# Patient Record
(demographics unavailable — no encounter records)

---

## 2024-10-14 NOTE — HISTORY OF PRESENT ILLNESS
[Disease: _____________________] : Disease: [unfilled] [M: ___] : M[unfilled] [AJCC Stage: ____] : AJCC Stage: [unfilled] [Therapy: ___] : Therapy: [unfilled] [1] : 1, Mild [90: Able to carry normal activity; minor signs or symptoms of disease.] : 90: Able to carry normal activity; minor signs or symptoms of disease.  [ECOG Performance Status: 1 - Restricted in physically strenuous activity but ambulatory and able to carry out work of a light or sedentary nature] : Performance Status: 1 - Restricted in physically strenuous activity but ambulatory and able to carry out work of a light or sedentary nature, e.g., light house work, office work [de-identified] : Gildardo Hawkins is a Cantonese speaking 67-year-old male with PMHx of HTN, HLD who presents to the clinic for hospital follow up of stage IV NSCLC - SCC histology, PD-L1 25%.  Onc hx: He had a recent hospitalization for syncope, was found to have left lung lingular mass with central necrosis and pleural effusion along with several LNodes. He was also found to have empyema and has been on antibiotics since.   7/8/24 CT scan showed 8.4 cm lingular mass with central necrosis and pleural effusion. He was also found to have a  2.3 cm left lateral pleural mass that abuts the lingular mass. He was also found to have persistent prevascular hypermetabolic chip mass~ 5.8 cm in diameter with central necrosis. This abuts a 2.2 cm aortopulmonary hypermetabolic node which is exerting mass effect on trachea. 1.3 cm R upper paratracheal mildly hypermetabolic node, 1.4 cm precarinal hypermetabolic node, 3.4 cm left hilar hypermetabolic node were also present.   7/26/24: Surgical Pathology Report  Left pleural and upper lobe biopsy showed Metastatic squamous cell carcinoma present in pleural tissue, and  marked fibrinous pleuritis with severe acute and chronic inflammation, ulceration, and fibrin deposition  - Left pleural fluid analysis was POSITIVE FOR MALIGNANT CELLS. Favor squamous cell carcinoma Tier II: Variants of Potential Clinical Significance CDKN2A p.([id number]) TP53 p.([id number]) Tier III: Variants of Unknown Clinical Significance ATR p.([id number]) TMB-high PDL-1 25%  7/16/24 MRI Brain: No hemorrhage, mass, midline shift or infarction. No abnormal enhancement. Mild white matter disease likely mild chronic microvascular ischemic disease.  7/24/24: PET/CT: Hypermetablic lingular mass with central necrosis, compatible with neoplasm. Persistent moderate left pleural effusion. Areas of increased FDG uptake in the left pleura, suspicious neoplastic involvement; most prominently, 2.3 cm left lateral pleural hypermetabolic mass. Mediastinal (including in the upper right paratracheal chip station) and left hilar hypermetabolic lymphadenopathy, compatible with metastatic neoplasm. The largest of these is perivascular lymph chip mass with necrosis.  Mildly thickened left adrenal gland with foci of increased FDG uptake; metastases cannot be excluded. Mildly thickened right adrenal gland, without abnormal FDG uptake. Medialization of the left vocal cord, which may be seen with left vocal cord paralysis. This may be related to the dominant mediastinal mass. Persistent peripheral left lower lobe airspace opacities without significant FDG uptake. This may be due to compressive atelectasis from the aforementioned left pleural effusion.   8/9/24: XR Chest: There is peripheral repositioning of a left-sided PICC the tip located in the midsuperior vena cava. The cardiac mediastinal and hilar contours are unchanged. Again noted is increased opacity over the left lower lung zone with silhouetting of the left heart border. There may be some air bronchograms and a parenchymal pneumonia/atelectasis unchanged from prior imaging cannot be excluded. There is left hemidiaphragm elevation. No pneumothorax.  9/3/24: CT Chest/Abdomen/Pelvis:  Marked worsening of pleural-parenchymal malignant process in the left hemithorax with enlargement of the existing masses and appearance of new pleural nodules. Worsening mediastinal lymphadenopathy. Small bone lesion in the left eighth rib with nondisplaced pathologic fracture due to direct pleural or distant metastasis. [de-identified] : squamous cell carcinoma - PDL-1 25%, CKDN2A [de-identified] : CTSx:   [de-identified] : Here for C2D1 carboplatin/Abraxane/pembrolizumab. Following diarrhea noted at last clinic visit, c. diff testing was found to be positive.  He was hospitalized at [facility] from 10/1/24-10/4/24 for c. diff colitis. Now completed 10 days of PO vancomycin with resolution of diarrhea.  Is currently having 2-3 formed bowel movements daily. No fever, chills, n/v, or abdominal pain. Was also started on Nystatin for suspected thrush, s/p course with improvement in dry mouth. Dysgeusia has improved (decreased taste, bitter taste). Appetite is good. [FreeTextEntry1] : - 9/12/2024: C1D1 carboplatin/Abraxane/pembrolizumab 9/19/2024: C1D8 Abraxane 9/26/2024: C1D15 Abraxane  [FreeTextEntry3] : Diarrhea

## 2024-10-14 NOTE — ASSESSMENT
[FreeTextEntry1] : Mr. Gildardo Hawkins is a Cantonese speaking 67-year-old male with PMHx of HTN, HLD who presents to the clinic for f/u of Stage IV squamous cell carcinoma     # stage IV squamous cell carcinoma (based on malignant pleural effusion)  --Liquid NGS testing showed PTEN R159S and [id number] positive, MSI high not detected --Tumor NGS PDL-1 25%, TMB-high, CDKN2A --risks include but are not limited to RALPH, liver injury, pancytopenias, sepsis, bleeding, infection, hair loss, neuropathy, allergic reactions, death. Educational materials in Cantonese provided. Informed consent signed. --CT CAP obtained 9/3, to establish new baseline. --started carboplatin/Abraxane/pembrolizumab on 9/12/24 --CBC, CMP weekly & TSH every 6 weeks. --labs reviewed, cleared for C2D1 carboplatin/Abraxane/pembrolizumab today  #diarrhea [RESOLVED] On C1D15, was having 7-8 episodes of loose stools daily. Was found to be c. diff positive, was hospitalized 10/1/24 to 10/4/24. s/p PO vancomycin x 10 days (last dose 10/11/24). Now having 2-3 formed BMs daily. - Imodium PRN - will monitor for diarrhea recurrence, would be concerned for c. diff vs. colitis 2/2 immunotherapy  #acute kidney injury [RESOLVED] - seen on C1D15 (Cr 1.20), ISO c. diff diarrhea, likely pre-renal due to GI losses. Now resolved. - encouraging PO hydration  # hypercalcemia [RESOLVED] - trend weekly CMP  RTC in 1 week for office visit, followed by C2D8 abraxane Labs- CBC, CMP  Please see attending physician attestation below.

## 2024-10-14 NOTE — ASSESSMENT
[FreeTextEntry1] : Mr. Gildardo Hawkins is a Cantonese speaking 67-year-old male with PMHx of HTN, HLD who presents to the clinic for f/u of Stage IV squamous cell carcinoma     # stage IV squamous cell carcinoma (based on malignant pleural effusion)  --Liquid NGS testing showed PTEN R159S and [id number] positive, MSI high not detected --Tumor NGS PDL-1 25%, TMB-high, CDKN2A --risks include but are not limited to RALPH, liver injury, pancytopenias, sepsis, bleeding, infection, hair loss, neuropathy, allergic reactions, death. Educational materials in Cantonese provided. Informed consent signed. --CT CAP obtained 9/3, to establish new baseline. --started carboplatin/Abraxane/pembrolizumab on 9/12/24 --CBC, CMP weekly & TSH every 6 weeks. --labs reviewed, cleared for C2D1 carboplatin/Abraxane/pembrolizumab today  #diarrhea [RESOLVED] On C1D15, was having 7-8 episodes of loose stools daily. Was found to be c. diff positive, was hospitalized 10/1/24 to 10/4/24. s/p PO vancomycin x 10 days (last dose 10/11/24). Now having 2-3 formed BMs daily. - Imodium PRN - will monitor for diarrhea recurrence, would be concerned for c. diff vs. colitis 2/2 immunotherapy  #acute kidney injury [RESOLVED] - seen on C1D15 (Cr 1.20), ISO c. diff diarrhea, likely pre-renal due to GI losses. Now resolved. - encouraging PO hydration  # hypercalcemia [RESOLVED] - trend weekly CMP  RTC in 1 week for office visit, followed by C2D8 abraxane Labs- CBC, CMP  Please see attending physician attestation below.

## 2024-10-14 NOTE — HISTORY OF PRESENT ILLNESS
[Disease: _____________________] : Disease: [unfilled] [M: ___] : M[unfilled] [AJCC Stage: ____] : AJCC Stage: [unfilled] [Therapy: ___] : Therapy: [unfilled] [1] : 1, Mild [90: Able to carry normal activity; minor signs or symptoms of disease.] : 90: Able to carry normal activity; minor signs or symptoms of disease.  [ECOG Performance Status: 1 - Restricted in physically strenuous activity but ambulatory and able to carry out work of a light or sedentary nature] : Performance Status: 1 - Restricted in physically strenuous activity but ambulatory and able to carry out work of a light or sedentary nature, e.g., light house work, office work [de-identified] : Gildardo Hawkins is a Cantonese speaking 67-year-old male with PMHx of HTN, HLD who presents to the clinic for hospital follow up of stage IV NSCLC - SCC histology, PD-L1 25%.  Onc hx: He had a recent hospitalization for syncope, was found to have left lung lingular mass with central necrosis and pleural effusion along with several LNodes. He was also found to have empyema and has been on antibiotics since.   7/8/24 CT scan showed 8.4 cm lingular mass with central necrosis and pleural effusion. He was also found to have a  2.3 cm left lateral pleural mass that abuts the lingular mass. He was also found to have persistent prevascular hypermetabolic chip mass~ 5.8 cm in diameter with central necrosis. This abuts a 2.2 cm aortopulmonary hypermetabolic node which is exerting mass effect on trachea. 1.3 cm R upper paratracheal mildly hypermetabolic node, 1.4 cm precarinal hypermetabolic node, 3.4 cm left hilar hypermetabolic node were also present.   7/26/24: Surgical Pathology Report  Left pleural and upper lobe biopsy showed Metastatic squamous cell carcinoma present in pleural tissue, and  marked fibrinous pleuritis with severe acute and chronic inflammation, ulceration, and fibrin deposition  - Left pleural fluid analysis was POSITIVE FOR MALIGNANT CELLS. Favor squamous cell carcinoma Tier II: Variants of Potential Clinical Significance CDKN2A p.([id number]) TP53 p.([id number]) Tier III: Variants of Unknown Clinical Significance ATR p.([id number]) TMB-high PDL-1 25%  7/16/24 MRI Brain: No hemorrhage, mass, midline shift or infarction. No abnormal enhancement. Mild white matter disease likely mild chronic microvascular ischemic disease.  7/24/24: PET/CT: Hypermetablic lingular mass with central necrosis, compatible with neoplasm. Persistent moderate left pleural effusion. Areas of increased FDG uptake in the left pleura, suspicious neoplastic involvement; most prominently, 2.3 cm left lateral pleural hypermetabolic mass. Mediastinal (including in the upper right paratracheal chip station) and left hilar hypermetabolic lymphadenopathy, compatible with metastatic neoplasm. The largest of these is perivascular lymph chip mass with necrosis.  Mildly thickened left adrenal gland with foci of increased FDG uptake; metastases cannot be excluded. Mildly thickened right adrenal gland, without abnormal FDG uptake. Medialization of the left vocal cord, which may be seen with left vocal cord paralysis. This may be related to the dominant mediastinal mass. Persistent peripheral left lower lobe airspace opacities without significant FDG uptake. This may be due to compressive atelectasis from the aforementioned left pleural effusion.   8/9/24: XR Chest: There is peripheral repositioning of a left-sided PICC the tip located in the midsuperior vena cava. The cardiac mediastinal and hilar contours are unchanged. Again noted is increased opacity over the left lower lung zone with silhouetting of the left heart border. There may be some air bronchograms and a parenchymal pneumonia/atelectasis unchanged from prior imaging cannot be excluded. There is left hemidiaphragm elevation. No pneumothorax.  9/3/24: CT Chest/Abdomen/Pelvis:  Marked worsening of pleural-parenchymal malignant process in the left hemithorax with enlargement of the existing masses and appearance of new pleural nodules. Worsening mediastinal lymphadenopathy. Small bone lesion in the left eighth rib with nondisplaced pathologic fracture due to direct pleural or distant metastasis. [de-identified] : squamous cell carcinoma - PDL-1 25%, CKDN2A [de-identified] : CTSx:   [FreeTextEntry1] : - 9/12/2024: C1D1 carboplatin/Abraxane/pembrolizumab 9/19/2024: C1D8 Abraxane 9/26/2024: C1D15 Abraxane  [de-identified] : Here for C2D1 carboplatin/Abraxane/pembrolizumab. Following diarrhea noted at last clinic visit, c. diff testing was found to be positive.  He was hospitalized at [facility] from 10/1/24-10/4/24 for c. diff colitis. Now completed 10 days of PO vancomycin with resolution of diarrhea.  Is currently having 2-3 formed bowel movements daily. No fever, chills, n/v, or abdominal pain. Was also started on Nystatin for suspected thrush, s/p course with improvement in dry mouth. Dysgeusia has improved (decreased taste, bitter taste). Appetite is good. [FreeTextEntry3] : Diarrhea

## 2024-10-14 NOTE — PHYSICAL EXAM
[de-identified] : no conjunctival injection; anicteric [Normal] : PERRL, EOMI, no conjunctival infection, anicteric [Ulcers] : no ulcers [Mucositis] : no mucositis [Thrush] : no thrush [de-identified] : clear thick saliva on tongue [de-identified] : supple [de-identified] : mildly decreased breath sounds at left upper lung field. No wheezes, rales, rhonchi [de-identified] : no cervical or supraclavicular lymphadenopathy appreciated

## 2024-10-14 NOTE — REVIEW OF SYSTEMS
[Hoarseness] : hoarseness [Cough] : cough [Diarrhea: Grade 1 - Increase of <4 stools per day over baseline; mild increase in ostomy output compared to baseline] : Diarrhea: Grade 1 - Increase of <4 stools per day over baseline; mild increase in ostomy output compared to baseline [Fever] : no fever [Chills] : no chills [Night Sweats] : no night sweats [Fatigue] : no fatigue [Vision Problems] : no vision problems [Dysphagia] : no dysphagia [Odynophagia] : no odynophagia [Chest Pain] : no chest pain [Palpitations] : no palpitations [Shortness Of Breath] : no shortness of breath [Wheezing] : no wheezing [Abdominal Pain] : no abdominal pain [Vomiting] : no vomiting [Dysuria] : no dysuria [Joint Pain] : no joint pain [Muscle Pain] : no muscle pain [Skin Rash] : no skin rash [Fainting] : no fainting [Easy Bleeding] : no tendency for easy bleeding [Easy Bruising] : no tendency for easy bruising [FreeTextEntry2] : good appetite [FreeTextEntry7] : +diarrhea [FreeTextEntry4] : +clear film on tongue, dry mouth sensation

## 2024-10-14 NOTE — END OF VISIT
[] : Fellow [FreeTextEntry3] : Patient seen with .  66 yo M with stage IV NSCLC, SCC histology here for f/u. -- He was recently admitted to the hospital for C. difficile colitis, has completed vancomycin p.o. therapy.  Diarrhea is now resolved. --Ordered CBC, CMP, TSH -- Patient is on chemotherapy with carboplatin/Abraxane/pembrolizumab requiring intensive monitoring for toxicity, cytopenias, liver and kidney function with CBC, CMP every 3 weeks and TSH every 6 weeks

## 2024-10-14 NOTE — PHYSICAL EXAM
[de-identified] : no conjunctival injection; anicteric [Normal] : PERRL, EOMI, no conjunctival infection, anicteric [Ulcers] : no ulcers [Mucositis] : no mucositis [Thrush] : no thrush [de-identified] : clear thick saliva on tongue [de-identified] : supple [de-identified] : mildly decreased breath sounds at left upper lung field. No wheezes, rales, rhonchi [de-identified] : no cervical or supraclavicular lymphadenopathy appreciated

## 2024-10-14 NOTE — PHYSICAL EXAM
[de-identified] : no conjunctival injection; anicteric [Normal] : PERRL, EOMI, no conjunctival infection, anicteric [Ulcers] : no ulcers [Mucositis] : no mucositis [Thrush] : no thrush [de-identified] : clear thick saliva on tongue [de-identified] : supple [de-identified] : mildly decreased breath sounds at left upper lung field. No wheezes, rales, rhonchi [de-identified] : no cervical or supraclavicular lymphadenopathy appreciated

## 2024-10-14 NOTE — REVIEW OF SYSTEMS
[Hoarseness] : hoarseness [Cough] : cough [Diarrhea: Grade 1 - Increase of <4 stools per day over baseline; mild increase in ostomy output compared to baseline] : Diarrhea: Grade 1 - Increase of <4 stools per day over baseline; mild increase in ostomy output compared to baseline [Fever] : no fever [Chills] : no chills [Night Sweats] : no night sweats [Fatigue] : no fatigue [Dysphagia] : no dysphagia [Vision Problems] : no vision problems [Odynophagia] : no odynophagia [Chest Pain] : no chest pain [Palpitations] : no palpitations [Shortness Of Breath] : no shortness of breath [Wheezing] : no wheezing [Abdominal Pain] : no abdominal pain [Vomiting] : no vomiting [Dysuria] : no dysuria [Joint Pain] : no joint pain [Muscle Pain] : no muscle pain [Skin Rash] : no skin rash [Fainting] : no fainting [Easy Bleeding] : no tendency for easy bleeding [Easy Bruising] : no tendency for easy bruising [FreeTextEntry2] : good appetite [FreeTextEntry4] : +clear film on tongue, dry mouth sensation [FreeTextEntry7] : +diarrhea

## 2024-10-14 NOTE — ASSESSMENT
[FreeTextEntry1] : Mr. Gildardo Hawkins is a Cantonese speaking 67-year-old male with PMHx of HTN, HLD who presents to the clinic for f/u of Stage IV squamous cell carcinoma     # stage IV squamous cell carcinoma (based on malignant pleural effusion)  --Liquid NGS testing showed PTEN R159S and [id number] positive, MSI high not detected --Tumor NGS PDL-1 25%, TMB-high, CDKN2A --risks include but are not limited to RALPH, liver injury, pancytopenias, sepsis, bleeding, infection, hair loss, neuropathy, allergic reactions, death. Educational materials in Cantonese provided. Informed consent signed. --CT CAP obtained 9/3, to establish new baseline. --started carboplatin/Abraxane/pembrolizumab on 9/12/24 --CBC, CMP weekly & TSH every 6 weeks. --labs reviewed, cleared for C2D1 carboplatin/Abraxane/pembrolizumab today  #diarrhea [RESOLVED] On C1D15, was having 7-8 episodes of loose stools daily. Was found to be c. diff positive, was hospitalized 10/1/24 to 10/4/24. s/p PO vancomycin x 10 days (last dose 10/11/24). Now having 2-3 formed BMs daily. - Imodium PRN - will monitor for diarrhea recurrence, would be concerned for c. diff vs. colitis 2/2 immunotherapy  #acute kidney injury [RESOLVED] - seen on C1D15 (Cr 1.20), ISO c. diff diarrhea, likely pre-renal due to GI losses. Now resolved. - encouraging PO hydration  # hypercalcemia [RESOLVED] - trend weekly CMP  RTC in 1 week for office visit, followed by C2D8 abraxane Labs- CBC, CMP  Please see attending physician attestation below.

## 2024-10-14 NOTE — REVIEW OF SYSTEMS
[Hoarseness] : hoarseness [Cough] : cough [Diarrhea: Grade 1 - Increase of <4 stools per day over baseline; mild increase in ostomy output compared to baseline] : Diarrhea: Grade 1 - Increase of <4 stools per day over baseline; mild increase in ostomy output compared to baseline [Fever] : no fever [Chills] : no chills [Night Sweats] : no night sweats [Fatigue] : no fatigue [Vision Problems] : no vision problems [Dysphagia] : no dysphagia [Odynophagia] : no odynophagia [Chest Pain] : no chest pain [Palpitations] : no palpitations [Shortness Of Breath] : no shortness of breath [Wheezing] : no wheezing [Abdominal Pain] : no abdominal pain [Vomiting] : no vomiting [Dysuria] : no dysuria [Joint Pain] : no joint pain [Muscle Pain] : no muscle pain [Skin Rash] : no skin rash [Easy Bleeding] : no tendency for easy bleeding [Fainting] : no fainting [Easy Bruising] : no tendency for easy bruising [FreeTextEntry2] : good appetite [FreeTextEntry7] : +diarrhea [FreeTextEntry4] : +clear film on tongue, dry mouth sensation

## 2024-10-21 NOTE — REVIEW OF SYSTEMS
[Hoarseness] : hoarseness [Cough] : cough [FreeTextEntry4] : +clear film on tongue, dry mouth sensation [Diarrhea: Grade 1 - Increase of <4 stools per day over baseline; mild increase in ostomy output compared to baseline] : Diarrhea: Grade 1 - Increase of <4 stools per day over baseline; mild increase in ostomy output compared to baseline [Fever] : no fever [Chills] : no chills [Night Sweats] : no night sweats [Fatigue] : no fatigue [Vision Problems] : no vision problems [Dysphagia] : no dysphagia [Odynophagia] : no odynophagia [Chest Pain] : no chest pain [Palpitations] : no palpitations [Shortness Of Breath] : no shortness of breath [Wheezing] : no wheezing [Abdominal Pain] : no abdominal pain [Vomiting] : no vomiting [Dysuria] : no dysuria [Joint Pain] : no joint pain [Muscle Pain] : no muscle pain [Skin Rash] : no skin rash [Fainting] : no fainting [Easy Bleeding] : no tendency for easy bleeding [Easy Bruising] : no tendency for easy bruising [FreeTextEntry2] : good appetite [FreeTextEntry7] : diarrhea improved

## 2024-10-21 NOTE — HISTORY OF PRESENT ILLNESS
[Disease: _____________________] : Disease: [unfilled] [M: ___] : M[unfilled] [AJCC Stage: ____] : AJCC Stage: [unfilled] [Therapy: ___] : Therapy: [unfilled] [1] : 1, Mild [90: Able to carry normal activity; minor signs or symptoms of disease.] : 90: Able to carry normal activity; minor signs or symptoms of disease.  [ECOG Performance Status: 1 - Restricted in physically strenuous activity but ambulatory and able to carry out work of a light or sedentary nature] : Performance Status: 1 - Restricted in physically strenuous activity but ambulatory and able to carry out work of a light or sedentary nature, e.g., light house work, office work [de-identified] : Gildardo Hawkins is a Cantonese speaking 67-year-old male with PMHx of HTN, HLD who presents to the clinic for hospital follow up of stage IV NSCLC - SCC histology, PD-L1 25%.  Onc hx: He had a recent hospitalization for syncope, was found to have left lung lingular mass with central necrosis and pleural effusion along with several LNodes. He was also found to have empyema and has been on antibiotics since.   7/8/24 CT scan showed 8.4 cm lingular mass with central necrosis and pleural effusion. He was also found to have a  2.3 cm left lateral pleural mass that abuts the lingular mass. He was also found to have persistent prevascular hypermetabolic chip mass~ 5.8 cm in diameter with central necrosis. This abuts a 2.2 cm aortopulmonary hypermetabolic node which is exerting mass effect on trachea. 1.3 cm R upper paratracheal mildly hypermetabolic node, 1.4 cm precarinal hypermetabolic node, 3.4 cm left hilar hypermetabolic node were also present.   7/26/24: Surgical Pathology Report  Left pleural and upper lobe biopsy showed Metastatic squamous cell carcinoma present in pleural tissue, and  marked fibrinous pleuritis with severe acute and chronic inflammation, ulceration, and fibrin deposition  - Left pleural fluid analysis was POSITIVE FOR MALIGNANT CELLS. Favor squamous cell carcinoma Tier II: Variants of Potential Clinical Significance CDKN2A p.([id number]) TP53 p.([id number]) Tier III: Variants of Unknown Clinical Significance ATR p.([id number]) TMB-high PDL-1 25%  7/16/24 MRI Brain: No hemorrhage, mass, midline shift or infarction. No abnormal enhancement. Mild white matter disease likely mild chronic microvascular ischemic disease.  7/24/24: PET/CT: Hypermetablic lingular mass with central necrosis, compatible with neoplasm. Persistent moderate left pleural effusion. Areas of increased FDG uptake in the left pleura, suspicious neoplastic involvement; most prominently, 2.3 cm left lateral pleural hypermetabolic mass. Mediastinal (including in the upper right paratracheal chip station) and left hilar hypermetabolic lymphadenopathy, compatible with metastatic neoplasm. The largest of these is perivascular lymph chip mass with necrosis.  Mildly thickened left adrenal gland with foci of increased FDG uptake; metastases cannot be excluded. Mildly thickened right adrenal gland, without abnormal FDG uptake. Medialization of the left vocal cord, which may be seen with left vocal cord paralysis. This may be related to the dominant mediastinal mass. Persistent peripheral left lower lobe airspace opacities without significant FDG uptake. This may be due to compressive atelectasis from the aforementioned left pleural effusion.   8/9/24: XR Chest: There is peripheral repositioning of a left-sided PICC the tip located in the midsuperior vena cava. The cardiac mediastinal and hilar contours are unchanged. Again noted is increased opacity over the left lower lung zone with silhouetting of the left heart border. There may be some air bronchograms and a parenchymal pneumonia/atelectasis unchanged from prior imaging cannot be excluded. There is left hemidiaphragm elevation. No pneumothorax.  9/3/24: CT Chest/Abdomen/Pelvis:  Marked worsening of pleural-parenchymal malignant process in the left hemithorax with enlargement of the existing masses and appearance of new pleural nodules. Worsening mediastinal lymphadenopathy. Small bone lesion in the left eighth rib with nondisplaced pathologic fracture due to direct pleural or distant metastasis. [de-identified] : squamous cell carcinoma - PDL-1 25%, CKDN2A [de-identified] : CTSx:   [FreeTextEntry1] : - 9/12/2024: C1D1 carboplatin/Abraxane/pembrolizumab 9/19/2024: C1D8 Abraxane 9/26/2024: C1D15 Abraxane  [de-identified] : Here for C2D8 abraxane. Diarrhea has improved, and patient now has up to only 2 BMs daily compared to 5-7 BMs previously.  Reports feeling strong with good energy levels and a good appetite. Denies neuropathy. [FreeTextEntry3] : Diarrhea

## 2024-10-21 NOTE — ASSESSMENT
[FreeTextEntry1] : Mr. Gildardo Hawkins is a Cantonese speaking 67-year-old male with PMHx of HTN, HLD who presents to the clinic for f/u of Stage IV squamous cell carcinoma     # stage IV squamous cell carcinoma (based on malignant pleural effusion)  --Liquid NGS testing showed PTEN R159S and [id number] positive, MSI high not detected --Tumor NGS PDL-1 25%, TMB-high, CDKN2A --risks include but are not limited to RALPH, liver injury, pancytopenias, sepsis, bleeding, infection, hair loss, neuropathy, allergic reactions, death. Educational materials in Cantonese provided. Informed consent signed. --CT CAP obtained 9/3, to establish new baseline. --started carboplatin/Abraxane/pembrolizumab on 9/12/24 --CBC, CMP weekly & TSH every 6 weeks. --labs reviewed, cleared for C2D8 abraxane  today  RTC in 1 week for office visit, followed by C2D15 abraxane Labs- CBC, CMP  Please see attending physician attestation below.

## 2024-10-21 NOTE — HISTORY OF PRESENT ILLNESS
[Disease: _____________________] : Disease: [unfilled] [M: ___] : M[unfilled] [AJCC Stage: ____] : AJCC Stage: [unfilled] [Therapy: ___] : Therapy: [unfilled] [1] : 1, Mild [90: Able to carry normal activity; minor signs or symptoms of disease.] : 90: Able to carry normal activity; minor signs or symptoms of disease.  [ECOG Performance Status: 1 - Restricted in physically strenuous activity but ambulatory and able to carry out work of a light or sedentary nature] : Performance Status: 1 - Restricted in physically strenuous activity but ambulatory and able to carry out work of a light or sedentary nature, e.g., light house work, office work [de-identified] : Gildardo Hawkins is a Cantonese speaking 67-year-old male with PMHx of HTN, HLD who presents to the clinic for hospital follow up of stage IV NSCLC - SCC histology, PD-L1 25%.  Onc hx: He had a recent hospitalization for syncope, was found to have left lung lingular mass with central necrosis and pleural effusion along with several LNodes. He was also found to have empyema and has been on antibiotics since.   7/8/24 CT scan showed 8.4 cm lingular mass with central necrosis and pleural effusion. He was also found to have a  2.3 cm left lateral pleural mass that abuts the lingular mass. He was also found to have persistent prevascular hypermetabolic chip mass~ 5.8 cm in diameter with central necrosis. This abuts a 2.2 cm aortopulmonary hypermetabolic node which is exerting mass effect on trachea. 1.3 cm R upper paratracheal mildly hypermetabolic node, 1.4 cm precarinal hypermetabolic node, 3.4 cm left hilar hypermetabolic node were also present.   7/26/24: Surgical Pathology Report  Left pleural and upper lobe biopsy showed Metastatic squamous cell carcinoma present in pleural tissue, and  marked fibrinous pleuritis with severe acute and chronic inflammation, ulceration, and fibrin deposition  - Left pleural fluid analysis was POSITIVE FOR MALIGNANT CELLS. Favor squamous cell carcinoma Tier II: Variants of Potential Clinical Significance CDKN2A p.([id number]) TP53 p.([id number]) Tier III: Variants of Unknown Clinical Significance ATR p.([id number]) TMB-high PDL-1 25%  7/16/24 MRI Brain: No hemorrhage, mass, midline shift or infarction. No abnormal enhancement. Mild white matter disease likely mild chronic microvascular ischemic disease.  7/24/24: PET/CT: Hypermetablic lingular mass with central necrosis, compatible with neoplasm. Persistent moderate left pleural effusion. Areas of increased FDG uptake in the left pleura, suspicious neoplastic involvement; most prominently, 2.3 cm left lateral pleural hypermetabolic mass. Mediastinal (including in the upper right paratracheal chip station) and left hilar hypermetabolic lymphadenopathy, compatible with metastatic neoplasm. The largest of these is perivascular lymph chip mass with necrosis.  Mildly thickened left adrenal gland with foci of increased FDG uptake; metastases cannot be excluded. Mildly thickened right adrenal gland, without abnormal FDG uptake. Medialization of the left vocal cord, which may be seen with left vocal cord paralysis. This may be related to the dominant mediastinal mass. Persistent peripheral left lower lobe airspace opacities without significant FDG uptake. This may be due to compressive atelectasis from the aforementioned left pleural effusion.   8/9/24: XR Chest: There is peripheral repositioning of a left-sided PICC the tip located in the midsuperior vena cava. The cardiac mediastinal and hilar contours are unchanged. Again noted is increased opacity over the left lower lung zone with silhouetting of the left heart border. There may be some air bronchograms and a parenchymal pneumonia/atelectasis unchanged from prior imaging cannot be excluded. There is left hemidiaphragm elevation. No pneumothorax.  9/3/24: CT Chest/Abdomen/Pelvis:  Marked worsening of pleural-parenchymal malignant process in the left hemithorax with enlargement of the existing masses and appearance of new pleural nodules. Worsening mediastinal lymphadenopathy. Small bone lesion in the left eighth rib with nondisplaced pathologic fracture due to direct pleural or distant metastasis. [de-identified] : squamous cell carcinoma - PDL-1 25%, CKDN2A [de-identified] : CTSx:   [FreeTextEntry1] : - 9/12/2024: C1D1 carboplatin/Abraxane/pembrolizumab 9/19/2024: C1D8 Abraxane 9/26/2024: C1D15 Abraxane  [de-identified] : Here for C2D8 abraxane. Diarrhea has improved, and patient now has up to only 2 BMs daily compared to 5-7 BMs previously.  Reports feeling strong with good energy levels and a good appetite. Denies neuropathy. [FreeTextEntry3] : Diarrhea

## 2024-10-21 NOTE — END OF VISIT
[] : Fellow [FreeTextEntry3] : Patient seen with .  66 yo M with stage IV NSCLC, SCC histology here for f/u. -- He was recently admitted to the hospital for C. difficile colitis, has completed vancomycin p.o. therapy.  Diarrhea is now resolved. --feeling much better since starting C2 therapy --history also provided by wife Nelda who states that patient has not had any more abominal cramps or diarrhea since re-starting therapy. Abx for Cdiff completed. --Ordered CBC, CMP -- Patient is on chemotherapy with carboplatin/Abraxane/pembrolizumab requiring intensive monitoring for toxicity, cytopenias, liver and kidney function with CBC, CMP every 3 weeks and TSH every 6 weeks

## 2024-10-21 NOTE — PHYSICAL EXAM
[Normal] : affect appropriate [de-identified] : clear thick saliva on tongue [de-identified] : mildly decreased breath sounds at left upper lung field. No wheezes, rales, rhonchi [Ulcers] : no ulcers [Mucositis] : no mucositis [Thrush] : no thrush [de-identified] : supple [de-identified] : no cervical or supraclavicular lymphadenopathy appreciated

## 2024-10-21 NOTE — PHYSICAL EXAM
[Normal] : affect appropriate [de-identified] : clear thick saliva on tongue [de-identified] : mildly decreased breath sounds at left upper lung field. No wheezes, rales, rhonchi [Ulcers] : no ulcers [Mucositis] : no mucositis [Thrush] : no thrush [de-identified] : supple [de-identified] : no cervical or supraclavicular lymphadenopathy appreciated

## 2024-10-21 NOTE — END OF VISIT
[] : Fellow [FreeTextEntry3] : Patient seen with .  68 yo M with stage IV NSCLC, SCC histology here for f/u. -- He was recently admitted to the hospital for C. difficile colitis, has completed vancomycin p.o. therapy.  Diarrhea is now resolved. --feeling much better since starting C2 therapy --history also provided by wife Nelda who states that patient has not had any more abominal cramps or diarrhea since re-starting therapy. Abx for Cdiff completed. --Ordered CBC, CMP -- Patient is on chemotherapy with carboplatin/Abraxane/pembrolizumab requiring intensive monitoring for toxicity, cytopenias, liver and kidney function with CBC, CMP every 3 weeks and TSH every 6 weeks

## 2024-10-21 NOTE — PHYSICAL EXAM
[Normal] : affect appropriate [de-identified] : clear thick saliva on tongue [de-identified] : mildly decreased breath sounds at left upper lung field. No wheezes, rales, rhonchi [Ulcers] : no ulcers [Mucositis] : no mucositis [Thrush] : no thrush [de-identified] : supple [de-identified] : no cervical or supraclavicular lymphadenopathy appreciated

## 2024-10-21 NOTE — HISTORY OF PRESENT ILLNESS
[Disease: _____________________] : Disease: [unfilled] [M: ___] : M[unfilled] [AJCC Stage: ____] : AJCC Stage: [unfilled] [Therapy: ___] : Therapy: [unfilled] [1] : 1, Mild [90: Able to carry normal activity; minor signs or symptoms of disease.] : 90: Able to carry normal activity; minor signs or symptoms of disease.  [ECOG Performance Status: 1 - Restricted in physically strenuous activity but ambulatory and able to carry out work of a light or sedentary nature] : Performance Status: 1 - Restricted in physically strenuous activity but ambulatory and able to carry out work of a light or sedentary nature, e.g., light house work, office work [de-identified] : Gildardo Hawkins is a Cantonese speaking 67-year-old male with PMHx of HTN, HLD who presents to the clinic for hospital follow up of stage IV NSCLC - SCC histology, PD-L1 25%.  Onc hx: He had a recent hospitalization for syncope, was found to have left lung lingular mass with central necrosis and pleural effusion along with several LNodes. He was also found to have empyema and has been on antibiotics since.   7/8/24 CT scan showed 8.4 cm lingular mass with central necrosis and pleural effusion. He was also found to have a  2.3 cm left lateral pleural mass that abuts the lingular mass. He was also found to have persistent prevascular hypermetabolic chip mass~ 5.8 cm in diameter with central necrosis. This abuts a 2.2 cm aortopulmonary hypermetabolic node which is exerting mass effect on trachea. 1.3 cm R upper paratracheal mildly hypermetabolic node, 1.4 cm precarinal hypermetabolic node, 3.4 cm left hilar hypermetabolic node were also present.   7/26/24: Surgical Pathology Report  Left pleural and upper lobe biopsy showed Metastatic squamous cell carcinoma present in pleural tissue, and  marked fibrinous pleuritis with severe acute and chronic inflammation, ulceration, and fibrin deposition  - Left pleural fluid analysis was POSITIVE FOR MALIGNANT CELLS. Favor squamous cell carcinoma Tier II: Variants of Potential Clinical Significance CDKN2A p.([id number]) TP53 p.([id number]) Tier III: Variants of Unknown Clinical Significance ATR p.([id number]) TMB-high PDL-1 25%  7/16/24 MRI Brain: No hemorrhage, mass, midline shift or infarction. No abnormal enhancement. Mild white matter disease likely mild chronic microvascular ischemic disease.  7/24/24: PET/CT: Hypermetablic lingular mass with central necrosis, compatible with neoplasm. Persistent moderate left pleural effusion. Areas of increased FDG uptake in the left pleura, suspicious neoplastic involvement; most prominently, 2.3 cm left lateral pleural hypermetabolic mass. Mediastinal (including in the upper right paratracheal chip station) and left hilar hypermetabolic lymphadenopathy, compatible with metastatic neoplasm. The largest of these is perivascular lymph chip mass with necrosis.  Mildly thickened left adrenal gland with foci of increased FDG uptake; metastases cannot be excluded. Mildly thickened right adrenal gland, without abnormal FDG uptake. Medialization of the left vocal cord, which may be seen with left vocal cord paralysis. This may be related to the dominant mediastinal mass. Persistent peripheral left lower lobe airspace opacities without significant FDG uptake. This may be due to compressive atelectasis from the aforementioned left pleural effusion.   8/9/24: XR Chest: There is peripheral repositioning of a left-sided PICC the tip located in the midsuperior vena cava. The cardiac mediastinal and hilar contours are unchanged. Again noted is increased opacity over the left lower lung zone with silhouetting of the left heart border. There may be some air bronchograms and a parenchymal pneumonia/atelectasis unchanged from prior imaging cannot be excluded. There is left hemidiaphragm elevation. No pneumothorax.  9/3/24: CT Chest/Abdomen/Pelvis:  Marked worsening of pleural-parenchymal malignant process in the left hemithorax with enlargement of the existing masses and appearance of new pleural nodules. Worsening mediastinal lymphadenopathy. Small bone lesion in the left eighth rib with nondisplaced pathologic fracture due to direct pleural or distant metastasis. [de-identified] : squamous cell carcinoma - PDL-1 25%, CKDN2A [de-identified] : CTSx:   [FreeTextEntry1] : - 9/12/2024: C1D1 carboplatin/Abraxane/pembrolizumab 9/19/2024: C1D8 Abraxane 9/26/2024: C1D15 Abraxane  [de-identified] : Here for C2D8 abraxane. Diarrhea has improved, and patient now has up to only 2 BMs daily compared to 5-7 BMs previously.  Reports feeling strong with good energy levels and a good appetite. Denies neuropathy. [FreeTextEntry3] : Diarrhea

## 2024-10-21 NOTE — ASSESSMENT
[FreeTextEntry1] : Mr. Gildardo Hawkins is a Cantonese speaking 67-year-old male with PMHx of HTN, HLD who presents to the clinic for f/u of Stage IV squamous cell carcinoma     # stage IV squamous cell carcinoma (based on malignant pleural effusion)  --Liquid NGS testing showed PTEN R159S and [id number] positive, MSI high not detected --Tumor NGS PDL-1 25%, TMB-high, CDKN2A --risks include but are not limited to RALPH, liver injury, pancytopenias, sepsis, bleeding, infection, hair loss, neuropathy, allergic reactions, death. Educational materials in Cantonese provided. Informed consent signed. --CT CAP obtained 9/3, to establish new baseline. --started carboplatin/Abraxane/pembrolizumab on 9/12/24 --CBC, CMP weekly & TSH every 6 weeks. --labs reviewed, cleared for C2D8 abraxane  today  RTC in 1 week for office visit, followed by C2D15 abraxane Labs- CBC, CMP  Please see attending physician attestation below.

## 2024-10-21 NOTE — ASSESSMENT
[FreeTextEntry1] : Mr. Gildardo Hawkins is a Cantonese speaking 67-year-old male with PMHx of HTN, HLD who presents to the clinic for f/u of Stage IV squamous cell carcinoma     # stage IV squamous cell carcinoma (based on malignant pleural effusion)  --Liquid NGS testing showed PTEN R159S and [id number] positive, MSI high not detected --Tumor NGS PDL-1 25%, TMB-high, CDKN2A --risks include but are not limited to RALPH, liver injury, pancytopenias, sepsis, bleeding, infection, hair loss, neuropathy, allergic reactions, death. Educational materials in Cantonese provided. Informed consent signed. --CT CAP obtained 9/3, to establish new baseline. --started carboplatin/Abraxane/pembrolizumab on 9/12/24 --CBC, CMP weekly & TSH every 6 weeks. --labs reviewed, cleared for C2D8 abraxane  today  RTC in 1 week for office visit, followed by C2D15 abraxane Labs- CBC, CMP  Please see attending physician attestation below.

## 2024-10-29 NOTE — ASSESSMENT
[FreeTextEntry1] : Mr. Gildardo Hawkins is a Cantonese speaking 67-year-old male with PMHx of HTN, HLD who presents to the clinic for f/u of Stage IV squamous cell carcinoma     # stage IV squamous cell carcinoma (based on malignant pleural effusion)  --Liquid NGS testing showed PTEN R159S and [id number] positive, MSI high not detected --Tumor NGS PDL-1 25%, TMB-high, CDKN2A --risks include but are not limited to RALPH, liver injury, pancytopenias, sepsis, bleeding, infection, hair loss, neuropathy, allergic reactions, death. Educational materials in Cantonese provided. Informed consent signed. --CT CAP obtained 9/3, to establish new baseline. --started carboplatin/Abraxane/pembrolizumab on 9/12/24 --CBC, CMP weekly & TSH every 6 weeks. --labs reviewed, cleared for C2D15 abraxane  today --will plan to repeat scans after C3  #r/o anxiety/depression --referring to Dr. Oliver for further evaluation  RTC in 1 week for office visit, followed by C3D1 carbo/abraxane/pembro Labs- CBC, CMP  Please see attending physician attestation below.

## 2024-10-29 NOTE — HISTORY OF PRESENT ILLNESS
[Disease: _____________________] : Disease: [unfilled] [M: ___] : M[unfilled] [AJCC Stage: ____] : AJCC Stage: [unfilled] [Therapy: ___] : Therapy: [unfilled] [1] : 1, Mild [90: Able to carry normal activity; minor signs or symptoms of disease.] : 90: Able to carry normal activity; minor signs or symptoms of disease.  [ECOG Performance Status: 1 - Restricted in physically strenuous activity but ambulatory and able to carry out work of a light or sedentary nature] : Performance Status: 1 - Restricted in physically strenuous activity but ambulatory and able to carry out work of a light or sedentary nature, e.g., light house work, office work [de-identified] : Gildardo Hawkins is a Cantonese speaking 67-year-old male with PMHx of HTN, HLD who presents to the clinic for hospital follow up of stage IV NSCLC - SCC histology, PD-L1 25%.  Onc hx: He had a recent hospitalization for syncope, was found to have left lung lingular mass with central necrosis and pleural effusion along with several LNodes. He was also found to have empyema and has been on antibiotics since.   7/8/24 CT scan showed 8.4 cm lingular mass with central necrosis and pleural effusion. He was also found to have a  2.3 cm left lateral pleural mass that abuts the lingular mass. He was also found to have persistent prevascular hypermetabolic chip mass~ 5.8 cm in diameter with central necrosis. This abuts a 2.2 cm aortopulmonary hypermetabolic node which is exerting mass effect on trachea. 1.3 cm R upper paratracheal mildly hypermetabolic node, 1.4 cm precarinal hypermetabolic node, 3.4 cm left hilar hypermetabolic node were also present.   7/26/24: Surgical Pathology Report  Left pleural and upper lobe biopsy showed Metastatic squamous cell carcinoma present in pleural tissue, and  marked fibrinous pleuritis with severe acute and chronic inflammation, ulceration, and fibrin deposition  - Left pleural fluid analysis was POSITIVE FOR MALIGNANT CELLS. Favor squamous cell carcinoma Tier II: Variants of Potential Clinical Significance CDKN2A p.([id number]) TP53 p.([id number]) Tier III: Variants of Unknown Clinical Significance ATR p.([id number]) TMB-high PDL-1 25%  7/16/24 MRI Brain: No hemorrhage, mass, midline shift or infarction. No abnormal enhancement. Mild white matter disease likely mild chronic microvascular ischemic disease.  7/24/24: PET/CT: Hypermetablic lingular mass with central necrosis, compatible with neoplasm. Persistent moderate left pleural effusion. Areas of increased FDG uptake in the left pleura, suspicious neoplastic involvement; most prominently, 2.3 cm left lateral pleural hypermetabolic mass. Mediastinal (including in the upper right paratracheal chip station) and left hilar hypermetabolic lymphadenopathy, compatible with metastatic neoplasm. The largest of these is perivascular lymph chip mass with necrosis.  Mildly thickened left adrenal gland with foci of increased FDG uptake; metastases cannot be excluded. Mildly thickened right adrenal gland, without abnormal FDG uptake. Medialization of the left vocal cord, which may be seen with left vocal cord paralysis. This may be related to the dominant mediastinal mass. Persistent peripheral left lower lobe airspace opacities without significant FDG uptake. This may be due to compressive atelectasis from the aforementioned left pleural effusion.   8/9/24: XR Chest: There is peripheral repositioning of a left-sided PICC the tip located in the midsuperior vena cava. The cardiac mediastinal and hilar contours are unchanged. Again noted is increased opacity over the left lower lung zone with silhouetting of the left heart border. There may be some air bronchograms and a parenchymal pneumonia/atelectasis unchanged from prior imaging cannot be excluded. There is left hemidiaphragm elevation. No pneumothorax.  9/3/24: CT Chest/Abdomen/Pelvis:  Marked worsening of pleural-parenchymal malignant process in the left hemithorax with enlargement of the existing masses and appearance of new pleural nodules. Worsening mediastinal lymphadenopathy. Small bone lesion in the left eighth rib with nondisplaced pathologic fracture due to direct pleural or distant metastasis. [de-identified] : squamous cell carcinoma - PDL-1 25%, CKDN2A [de-identified] : CTSx:   [FreeTextEntry1] : - 9/12/2024: C1D1 carboplatin/Abraxane/pembrolizumab 9/19/2024: C1D8 Abraxane 9/26/2024: C1D15 Abraxane 10/14/2024: C2D1 Carboplatin/abraxane/pembrolizumab 10/21/2024: C2D8 abraxane 10/28/2024: C2D15 abraxane  [de-identified] : Here for C2D15 abraxane. BMs have normalized. patient is regaining his weight. He reports feeling well with no current complaints.  [FreeTextEntry3] : Diarrhea

## 2024-10-29 NOTE — PHYSICAL EXAM
[Normal] : affect appropriate [Ulcers] : no ulcers [Mucositis] : no mucositis [Thrush] : no thrush [de-identified] : supple [de-identified] : no cervical or supraclavicular lymphadenopathy appreciated

## 2024-10-29 NOTE — PHYSICAL EXAM
[Normal] : affect appropriate [Ulcers] : no ulcers [Mucositis] : no mucositis [Thrush] : no thrush [de-identified] : supple [de-identified] : no cervical or supraclavicular lymphadenopathy appreciated

## 2024-10-29 NOTE — PHYSICAL EXAM
[Normal] : affect appropriate [Ulcers] : no ulcers [Mucositis] : no mucositis [Thrush] : no thrush [de-identified] : supple [de-identified] : no cervical or supraclavicular lymphadenopathy appreciated

## 2024-10-29 NOTE — REVIEW OF SYSTEMS
[Hoarseness] : hoarseness [Cough] : cough [Diarrhea: Grade 1 - Increase of <4 stools per day over baseline; mild increase in ostomy output compared to baseline] : Diarrhea: Grade 1 - Increase of <4 stools per day over baseline; mild increase in ostomy output compared to baseline [Fever] : no fever [Chills] : no chills [Night Sweats] : no night sweats [Fatigue] : no fatigue [Vision Problems] : no vision problems [Dysphagia] : no dysphagia [Odynophagia] : no odynophagia [Chest Pain] : no chest pain [Palpitations] : no palpitations [Shortness Of Breath] : no shortness of breath [Wheezing] : no wheezing [Abdominal Pain] : no abdominal pain [Vomiting] : no vomiting [Dysuria] : no dysuria [Joint Pain] : no joint pain [Muscle Pain] : no muscle pain [Skin Rash] : no skin rash [Fainting] : no fainting [Easy Bleeding] : no tendency for easy bleeding [Easy Bruising] : no tendency for easy bruising [FreeTextEntry2] : good appetite [FreeTextEntry7] : diarrhea improved

## 2024-10-29 NOTE — HISTORY OF PRESENT ILLNESS
[Disease: _____________________] : Disease: [unfilled] [M: ___] : M[unfilled] [AJCC Stage: ____] : AJCC Stage: [unfilled] [Therapy: ___] : Therapy: [unfilled] [1] : 1, Mild [90: Able to carry normal activity; minor signs or symptoms of disease.] : 90: Able to carry normal activity; minor signs or symptoms of disease.  [ECOG Performance Status: 1 - Restricted in physically strenuous activity but ambulatory and able to carry out work of a light or sedentary nature] : Performance Status: 1 - Restricted in physically strenuous activity but ambulatory and able to carry out work of a light or sedentary nature, e.g., light house work, office work [de-identified] : Gildardo Hawkins is a Cantonese speaking 67-year-old male with PMHx of HTN, HLD who presents to the clinic for hospital follow up of stage IV NSCLC - SCC histology, PD-L1 25%.  Onc hx: He had a recent hospitalization for syncope, was found to have left lung lingular mass with central necrosis and pleural effusion along with several LNodes. He was also found to have empyema and has been on antibiotics since.   7/8/24 CT scan showed 8.4 cm lingular mass with central necrosis and pleural effusion. He was also found to have a  2.3 cm left lateral pleural mass that abuts the lingular mass. He was also found to have persistent prevascular hypermetabolic chip mass~ 5.8 cm in diameter with central necrosis. This abuts a 2.2 cm aortopulmonary hypermetabolic node which is exerting mass effect on trachea. 1.3 cm R upper paratracheal mildly hypermetabolic node, 1.4 cm precarinal hypermetabolic node, 3.4 cm left hilar hypermetabolic node were also present.   7/26/24: Surgical Pathology Report  Left pleural and upper lobe biopsy showed Metastatic squamous cell carcinoma present in pleural tissue, and  marked fibrinous pleuritis with severe acute and chronic inflammation, ulceration, and fibrin deposition  - Left pleural fluid analysis was POSITIVE FOR MALIGNANT CELLS. Favor squamous cell carcinoma Tier II: Variants of Potential Clinical Significance CDKN2A p.([id number]) TP53 p.([id number]) Tier III: Variants of Unknown Clinical Significance ATR p.([id number]) TMB-high PDL-1 25%  7/16/24 MRI Brain: No hemorrhage, mass, midline shift or infarction. No abnormal enhancement. Mild white matter disease likely mild chronic microvascular ischemic disease.  7/24/24: PET/CT: Hypermetablic lingular mass with central necrosis, compatible with neoplasm. Persistent moderate left pleural effusion. Areas of increased FDG uptake in the left pleura, suspicious neoplastic involvement; most prominently, 2.3 cm left lateral pleural hypermetabolic mass. Mediastinal (including in the upper right paratracheal chip station) and left hilar hypermetabolic lymphadenopathy, compatible with metastatic neoplasm. The largest of these is perivascular lymph chip mass with necrosis.  Mildly thickened left adrenal gland with foci of increased FDG uptake; metastases cannot be excluded. Mildly thickened right adrenal gland, without abnormal FDG uptake. Medialization of the left vocal cord, which may be seen with left vocal cord paralysis. This may be related to the dominant mediastinal mass. Persistent peripheral left lower lobe airspace opacities without significant FDG uptake. This may be due to compressive atelectasis from the aforementioned left pleural effusion.   8/9/24: XR Chest: There is peripheral repositioning of a left-sided PICC the tip located in the midsuperior vena cava. The cardiac mediastinal and hilar contours are unchanged. Again noted is increased opacity over the left lower lung zone with silhouetting of the left heart border. There may be some air bronchograms and a parenchymal pneumonia/atelectasis unchanged from prior imaging cannot be excluded. There is left hemidiaphragm elevation. No pneumothorax.  9/3/24: CT Chest/Abdomen/Pelvis:  Marked worsening of pleural-parenchymal malignant process in the left hemithorax with enlargement of the existing masses and appearance of new pleural nodules. Worsening mediastinal lymphadenopathy. Small bone lesion in the left eighth rib with nondisplaced pathologic fracture due to direct pleural or distant metastasis. [de-identified] : squamous cell carcinoma - PDL-1 25%, CKDN2A [de-identified] : CTSx:   [FreeTextEntry1] : - 9/12/2024: C1D1 carboplatin/Abraxane/pembrolizumab 9/19/2024: C1D8 Abraxane 9/26/2024: C1D15 Abraxane 10/14/2024: C2D1 Carboplatin/abraxane/pembrolizumab 10/21/2024: C2D8 abraxane 10/28/2024: C2D15 abraxane  [de-identified] : Here for C2D15 abraxane. BMs have normalized. patient is regaining his weight. He reports feeling well with no current complaints.  [FreeTextEntry3] : Diarrhea

## 2024-10-29 NOTE — ASSESSMENT
[FreeTextEntry1] : Mr. Gildardo Hawkins is a Cantonese speaking 67-year-old male with PMHx of HTN, HLD who presents to the clinic for f/u of Stage IV squamous cell carcinoma     # stage IV squamous cell carcinoma (based on malignant pleural effusion)  --Liquid NGS testing showed PTEN R159S and [id number] positive, MSI high not detected --Tumor NGS PDL-1 25%, TMB-high, CDKN2A --risks include but are not limited to RALPH, liver injury, pancytopenias, sepsis, bleeding, infection, hair loss, neuropathy, allergic reactions, death. Educational materials in Cantonese provided. Informed consent signed. --CT CAP obtained 9/3, to establish new baseline. --started carboplatin/Abraxane/pembrolizumab on 9/12/24 --CBC, CMP weekly & TSH every 6 weeks. --labs reviewed, cleared for C2D15 abraxane  today --will plan to repeat scans after C3  #r/o anxiety/depression --referring to Dr. Oliver for further evaluation  RTC in 1 week for office visit, followed by C3D1 carbo/abraxane/pembro Labs- CBC, CMP  Please see attending physician attestation below.

## 2024-10-29 NOTE — ASSESSMENT
[FreeTextEntry1] : Mr. Gildardo Hawkins is a Cantonese speaking 67-year-old male with PMHx of HTN, HLD who presents to the clinic for f/u of Stage IV squamous cell carcinoma     # stage IV squamous cell carcinoma (based on malignant pleural effusion)  --Liquid NGS testing showed PTEN R159S and [id number] positive, MSI high not detected --Tumor NGS PDL-1 25%, TMB-high, CDKN2A --risks include but are not limited to RALPH, liver injury, pancytopenias, sepsis, bleeding, infection, hair loss, neuropathy, allergic reactions, death. Educational materials in Cantonese provided. Informed consent signed. --CT CAP obtained 9/3, to establish new baseline. --started carboplatin/Abraxane/pembrolizumab on 9/12/24 --CBC, CMP weekly & TSH every 6 weeks. --labs reviewed, cleared for C2D15 abraxane  today --will plan to repeat scans after C3  #r/o anxiety/depression --referring to Dr. Oliver for further evaluation  RTC in 1 week for office visit, followed by C3D1 carbo/abraxane/pembro Labs- CBC, CMP  Please see attending physician attestation below.

## 2024-10-29 NOTE — HISTORY OF PRESENT ILLNESS
[Disease: _____________________] : Disease: [unfilled] [M: ___] : M[unfilled] [AJCC Stage: ____] : AJCC Stage: [unfilled] [Therapy: ___] : Therapy: [unfilled] [1] : 1, Mild [90: Able to carry normal activity; minor signs or symptoms of disease.] : 90: Able to carry normal activity; minor signs or symptoms of disease.  [ECOG Performance Status: 1 - Restricted in physically strenuous activity but ambulatory and able to carry out work of a light or sedentary nature] : Performance Status: 1 - Restricted in physically strenuous activity but ambulatory and able to carry out work of a light or sedentary nature, e.g., light house work, office work [de-identified] : Gildardo Hawkins is a Cantonese speaking 67-year-old male with PMHx of HTN, HLD who presents to the clinic for hospital follow up of stage IV NSCLC - SCC histology, PD-L1 25%.  Onc hx: He had a recent hospitalization for syncope, was found to have left lung lingular mass with central necrosis and pleural effusion along with several LNodes. He was also found to have empyema and has been on antibiotics since.   7/8/24 CT scan showed 8.4 cm lingular mass with central necrosis and pleural effusion. He was also found to have a  2.3 cm left lateral pleural mass that abuts the lingular mass. He was also found to have persistent prevascular hypermetabolic chip mass~ 5.8 cm in diameter with central necrosis. This abuts a 2.2 cm aortopulmonary hypermetabolic node which is exerting mass effect on trachea. 1.3 cm R upper paratracheal mildly hypermetabolic node, 1.4 cm precarinal hypermetabolic node, 3.4 cm left hilar hypermetabolic node were also present.   7/26/24: Surgical Pathology Report  Left pleural and upper lobe biopsy showed Metastatic squamous cell carcinoma present in pleural tissue, and  marked fibrinous pleuritis with severe acute and chronic inflammation, ulceration, and fibrin deposition  - Left pleural fluid analysis was POSITIVE FOR MALIGNANT CELLS. Favor squamous cell carcinoma Tier II: Variants of Potential Clinical Significance CDKN2A p.([id number]) TP53 p.([id number]) Tier III: Variants of Unknown Clinical Significance ATR p.([id number]) TMB-high PDL-1 25%  7/16/24 MRI Brain: No hemorrhage, mass, midline shift or infarction. No abnormal enhancement. Mild white matter disease likely mild chronic microvascular ischemic disease.  7/24/24: PET/CT: Hypermetablic lingular mass with central necrosis, compatible with neoplasm. Persistent moderate left pleural effusion. Areas of increased FDG uptake in the left pleura, suspicious neoplastic involvement; most prominently, 2.3 cm left lateral pleural hypermetabolic mass. Mediastinal (including in the upper right paratracheal chip station) and left hilar hypermetabolic lymphadenopathy, compatible with metastatic neoplasm. The largest of these is perivascular lymph chip mass with necrosis.  Mildly thickened left adrenal gland with foci of increased FDG uptake; metastases cannot be excluded. Mildly thickened right adrenal gland, without abnormal FDG uptake. Medialization of the left vocal cord, which may be seen with left vocal cord paralysis. This may be related to the dominant mediastinal mass. Persistent peripheral left lower lobe airspace opacities without significant FDG uptake. This may be due to compressive atelectasis from the aforementioned left pleural effusion.   8/9/24: XR Chest: There is peripheral repositioning of a left-sided PICC the tip located in the midsuperior vena cava. The cardiac mediastinal and hilar contours are unchanged. Again noted is increased opacity over the left lower lung zone with silhouetting of the left heart border. There may be some air bronchograms and a parenchymal pneumonia/atelectasis unchanged from prior imaging cannot be excluded. There is left hemidiaphragm elevation. No pneumothorax.  9/3/24: CT Chest/Abdomen/Pelvis:  Marked worsening of pleural-parenchymal malignant process in the left hemithorax with enlargement of the existing masses and appearance of new pleural nodules. Worsening mediastinal lymphadenopathy. Small bone lesion in the left eighth rib with nondisplaced pathologic fracture due to direct pleural or distant metastasis. [de-identified] : squamous cell carcinoma - PDL-1 25%, CKDN2A [de-identified] : CTSx:   [FreeTextEntry1] : - 9/12/2024: C1D1 carboplatin/Abraxane/pembrolizumab 9/19/2024: C1D8 Abraxane 9/26/2024: C1D15 Abraxane 10/14/2024: C2D1 Carboplatin/abraxane/pembrolizumab 10/21/2024: C2D8 abraxane 10/28/2024: C2D15 abraxane  [de-identified] : Here for C2D15 abraxane. BMs have normalized. patient is regaining his weight. He reports feeling well with no current complaints.  [FreeTextEntry3] : Diarrhea

## 2024-10-29 NOTE — END OF VISIT
[] : Fellow [FreeTextEntry3] : Patient seen with .  68 yo M with stage IV NSCLC, SCC histology here for f/u. -- He was recently admitted to the hospital for C. difficile colitis, has completed vancomycin p.o. therapy.  Diarrhea is now resolved. --feeling much better since starting C2 therapy --history also provided by wife Nelda who states that patient has not had any more abominal cramps or diarrhea since re-starting therapy. Abx for Cdiff completed. --Ordered CBC, CMP -- Patient is on chemotherapy with carboplatin/Abraxane/pembrolizumab requiring intensive monitoring for toxicity, cytopenias, liver and kidney function with CBC, CMP every 3 weeks and TSH every 6 weeks [Time Spent: ___ minutes] : I have spent [unfilled] minutes of time on the encounter which excludes teaching and separately reported services.

## 2024-10-29 NOTE — END OF VISIT
[] : Fellow [FreeTextEntry3] : Patient seen with .  66 yo M with stage IV NSCLC, SCC histology here for f/u. -- He was recently admitted to the hospital for C. difficile colitis, has completed vancomycin p.o. therapy.  Diarrhea is now resolved. --feeling much better since starting C2 therapy --history also provided by wife Nelda who states that patient has not had any more abominal cramps or diarrhea since re-starting therapy. Abx for Cdiff completed. --Ordered CBC, CMP -- Patient is on chemotherapy with carboplatin/Abraxane/pembrolizumab requiring intensive monitoring for toxicity, cytopenias, liver and kidney function with CBC, CMP every 3 weeks and TSH every 6 weeks [Time Spent: ___ minutes] : I have spent [unfilled] minutes of time on the encounter which excludes teaching and separately reported services.

## 2024-11-04 NOTE — PHYSICAL EXAM
[Normal] : affect appropriate [Ulcers] : no ulcers [Mucositis] : no mucositis [Thrush] : no thrush [de-identified] : supple [de-identified] : no cervical or supraclavicular lymphadenopathy appreciated

## 2024-11-04 NOTE — ASSESSMENT
[FreeTextEntry1] : Mr. Gildardo Hawkins is a Cantonese speaking 68-year-old male with PMHx of HTN, HLD who presents to the clinic for f/u of Stage IV squamous cell carcinoma.     # stage IV squamous cell carcinoma (based on malignant pleural effusion)  --Liquid NGS testing showed PTEN R159S and [id number] positive, MSI high not detected --Tumor NGS PDL-1 25%, TMB-high, CDKN2A -- started carboplatin/Abraxane/pembrolizumab on 9/12/24 --Ordered CBC, CMP, TSH today -- patient is on therapy with carboplatin/Abraxane/pembrolizumab requiring intensive monitoring for toxicity such as RALPH, transaminitis with CBC, CMP weekly & TSH every 6 weeks -- Will follow-up with him in 3 weeks, ordered CT chest, CT abdomen pelvis to be performed 2 weeks for now for restaging post cycle 3  #r/o anxiety/depression --referring to Dr. Oliver for further evaluation  RTC in 3 wks

## 2024-11-04 NOTE — ASSESSMENT
[FreeTextEntry1] : Mr. Gildardo Hawkins is a Cantonese speaking 68-year-old male with PMHx of HTN, HLD who presents to the clinic for f/u of Stage IV squamous cell carcinoma.     # stage IV squamous cell carcinoma (based on malignant pleural effusion)  --Liquid NGS testing showed PTEN R159S and [id number] positive, MSI high not detected --Tumor NGS PDL-1 25%, TMB-high, CDKN2A -- started carboplatin/Abraxane/pembrolizumab on 9/12/24 --Ordered CBC, CMP, TSH today -- patient is on therapy with carboplatin/Abraxane/pembrolizumab requiring intensive monitoring for toxicity such as RALPH, transaminitis with CBC, CMP weekly & TSH every 6 weeks -- Will follow-up with him in 3 weeks, ordered CT chest, CT abdomen pelvis to be performed 2 weeks for now for restaging post cycle 3  #r/o anxiety/depression --referring to Dr. Oliver for further evaluation  RTC in 3 wks

## 2024-11-04 NOTE — HISTORY OF PRESENT ILLNESS
[Disease: _____________________] : Disease: [unfilled] [M: ___] : M[unfilled] [AJCC Stage: ____] : AJCC Stage: [unfilled] [Therapy: ___] : Therapy: [unfilled] [1] : 1, Mild [90: Able to carry normal activity; minor signs or symptoms of disease.] : 90: Able to carry normal activity; minor signs or symptoms of disease.  [ECOG Performance Status: 1 - Restricted in physically strenuous activity but ambulatory and able to carry out work of a light or sedentary nature] : Performance Status: 1 - Restricted in physically strenuous activity but ambulatory and able to carry out work of a light or sedentary nature, e.g., light house work, office work [de-identified] : Gildardo Hawkins is a Cantonese speaking 67-year-old male with PMHx of HTN, HLD who presents to the clinic for hospital follow up of stage IV NSCLC - SCC histology, PD-L1 25%.  Onc hx: He had a recent hospitalization for syncope, was found to have left lung lingular mass with central necrosis and pleural effusion along with several LNodes. He was also found to have empyema and has been on antibiotics since.   7/8/24 CT scan showed 8.4 cm lingular mass with central necrosis and pleural effusion. He was also found to have a  2.3 cm left lateral pleural mass that abuts the lingular mass. He was also found to have persistent prevascular hypermetabolic chip mass~ 5.8 cm in diameter with central necrosis. This abuts a 2.2 cm aortopulmonary hypermetabolic node which is exerting mass effect on trachea. 1.3 cm R upper paratracheal mildly hypermetabolic node, 1.4 cm precarinal hypermetabolic node, 3.4 cm left hilar hypermetabolic node were also present.   7/26/24: Surgical Pathology Report  Left pleural and upper lobe biopsy showed Metastatic squamous cell carcinoma present in pleural tissue, and  marked fibrinous pleuritis with severe acute and chronic inflammation, ulceration, and fibrin deposition  - Left pleural fluid analysis was POSITIVE FOR MALIGNANT CELLS. Favor squamous cell carcinoma Tier II: Variants of Potential Clinical Significance CDKN2A p.([id number]) TP53 p.([id number]) Tier III: Variants of Unknown Clinical Significance ATR p.([id number]) TMB-high PDL-1 25%  7/16/24 MRI Brain: No hemorrhage, mass, midline shift or infarction. No abnormal enhancement. Mild white matter disease likely mild chronic microvascular ischemic disease.  7/24/24: PET/CT: Hypermetablic lingular mass with central necrosis, compatible with neoplasm. Persistent moderate left pleural effusion. Areas of increased FDG uptake in the left pleura, suspicious neoplastic involvement; most prominently, 2.3 cm left lateral pleural hypermetabolic mass. Mediastinal (including in the upper right paratracheal chip station) and left hilar hypermetabolic lymphadenopathy, compatible with metastatic neoplasm. The largest of these is perivascular lymph chip mass with necrosis.  Mildly thickened left adrenal gland with foci of increased FDG uptake; metastases cannot be excluded. Mildly thickened right adrenal gland, without abnormal FDG uptake. Medialization of the left vocal cord, which may be seen with left vocal cord paralysis. This may be related to the dominant mediastinal mass. Persistent peripheral left lower lobe airspace opacities without significant FDG uptake. This may be due to compressive atelectasis from the aforementioned left pleural effusion.   8/9/24: XR Chest: There is peripheral repositioning of a left-sided PICC the tip located in the midsuperior vena cava. The cardiac mediastinal and hilar contours are unchanged. Again noted is increased opacity over the left lower lung zone with silhouetting of the left heart border. There may be some air bronchograms and a parenchymal pneumonia/atelectasis unchanged from prior imaging cannot be excluded. There is left hemidiaphragm elevation. No pneumothorax.  9/3/24: CT Chest/Abdomen/Pelvis:  Marked worsening of pleural-parenchymal malignant process in the left hemithorax with enlargement of the existing masses and appearance of new pleural nodules. Worsening mediastinal lymphadenopathy. Small bone lesion in the left eighth rib with nondisplaced pathologic fracture due to direct pleural or distant metastasis. [de-identified] : squamous cell carcinoma - PDL-1 25%, CKDN2A [de-identified] : CTSx:   [FreeTextEntry1] : - 9/12/2024: C1D1 carboplatin/Abraxane/pembrolizumab 9/19/2024: C1D8 Abraxane 9/26/2024: C1D15 Abraxane 10/14/2024: C2D1 Carboplatin/abraxane/pembrolizumab 10/21/2024: C2D8 abraxane 10/28/2024: C2D15 abraxane 11/4/2024: C3D1 carboplatin/Abraxane/pembrolizumab [de-identified] : The patient reports having very good appetite.  He offers no complaints. [FreeTextEntry3] : Diarrhea

## 2024-11-04 NOTE — HISTORY OF PRESENT ILLNESS
[Disease: _____________________] : Disease: [unfilled] [M: ___] : M[unfilled] [AJCC Stage: ____] : AJCC Stage: [unfilled] [Therapy: ___] : Therapy: [unfilled] [1] : 1, Mild [90: Able to carry normal activity; minor signs or symptoms of disease.] : 90: Able to carry normal activity; minor signs or symptoms of disease.  [ECOG Performance Status: 1 - Restricted in physically strenuous activity but ambulatory and able to carry out work of a light or sedentary nature] : Performance Status: 1 - Restricted in physically strenuous activity but ambulatory and able to carry out work of a light or sedentary nature, e.g., light house work, office work [de-identified] : Gildardo Hawkins is a Cantonese speaking 67-year-old male with PMHx of HTN, HLD who presents to the clinic for hospital follow up of stage IV NSCLC - SCC histology, PD-L1 25%.  Onc hx: He had a recent hospitalization for syncope, was found to have left lung lingular mass with central necrosis and pleural effusion along with several LNodes. He was also found to have empyema and has been on antibiotics since.   7/8/24 CT scan showed 8.4 cm lingular mass with central necrosis and pleural effusion. He was also found to have a  2.3 cm left lateral pleural mass that abuts the lingular mass. He was also found to have persistent prevascular hypermetabolic chip mass~ 5.8 cm in diameter with central necrosis. This abuts a 2.2 cm aortopulmonary hypermetabolic node which is exerting mass effect on trachea. 1.3 cm R upper paratracheal mildly hypermetabolic node, 1.4 cm precarinal hypermetabolic node, 3.4 cm left hilar hypermetabolic node were also present.   7/26/24: Surgical Pathology Report  Left pleural and upper lobe biopsy showed Metastatic squamous cell carcinoma present in pleural tissue, and  marked fibrinous pleuritis with severe acute and chronic inflammation, ulceration, and fibrin deposition  - Left pleural fluid analysis was POSITIVE FOR MALIGNANT CELLS. Favor squamous cell carcinoma Tier II: Variants of Potential Clinical Significance CDKN2A p.([id number]) TP53 p.([id number]) Tier III: Variants of Unknown Clinical Significance ATR p.([id number]) TMB-high PDL-1 25%  7/16/24 MRI Brain: No hemorrhage, mass, midline shift or infarction. No abnormal enhancement. Mild white matter disease likely mild chronic microvascular ischemic disease.  7/24/24: PET/CT: Hypermetablic lingular mass with central necrosis, compatible with neoplasm. Persistent moderate left pleural effusion. Areas of increased FDG uptake in the left pleura, suspicious neoplastic involvement; most prominently, 2.3 cm left lateral pleural hypermetabolic mass. Mediastinal (including in the upper right paratracheal chip station) and left hilar hypermetabolic lymphadenopathy, compatible with metastatic neoplasm. The largest of these is perivascular lymph chip mass with necrosis.  Mildly thickened left adrenal gland with foci of increased FDG uptake; metastases cannot be excluded. Mildly thickened right adrenal gland, without abnormal FDG uptake. Medialization of the left vocal cord, which may be seen with left vocal cord paralysis. This may be related to the dominant mediastinal mass. Persistent peripheral left lower lobe airspace opacities without significant FDG uptake. This may be due to compressive atelectasis from the aforementioned left pleural effusion.   8/9/24: XR Chest: There is peripheral repositioning of a left-sided PICC the tip located in the midsuperior vena cava. The cardiac mediastinal and hilar contours are unchanged. Again noted is increased opacity over the left lower lung zone with silhouetting of the left heart border. There may be some air bronchograms and a parenchymal pneumonia/atelectasis unchanged from prior imaging cannot be excluded. There is left hemidiaphragm elevation. No pneumothorax.  9/3/24: CT Chest/Abdomen/Pelvis:  Marked worsening of pleural-parenchymal malignant process in the left hemithorax with enlargement of the existing masses and appearance of new pleural nodules. Worsening mediastinal lymphadenopathy. Small bone lesion in the left eighth rib with nondisplaced pathologic fracture due to direct pleural or distant metastasis. [de-identified] : squamous cell carcinoma - PDL-1 25%, CKDN2A [de-identified] : CTSx:   [FreeTextEntry1] : - 9/12/2024: C1D1 carboplatin/Abraxane/pembrolizumab 9/19/2024: C1D8 Abraxane 9/26/2024: C1D15 Abraxane 10/14/2024: C2D1 Carboplatin/abraxane/pembrolizumab 10/21/2024: C2D8 abraxane 10/28/2024: C2D15 abraxane 11/4/2024: C3D1 carboplatin/Abraxane/pembrolizumab [de-identified] : The patient reports having very good appetite.  He offers no complaints. [FreeTextEntry3] : Diarrhea

## 2024-11-04 NOTE — PHYSICAL EXAM
[Normal] : affect appropriate [Ulcers] : no ulcers [Mucositis] : no mucositis [Thrush] : no thrush [de-identified] : supple [de-identified] : no cervical or supraclavicular lymphadenopathy appreciated

## 2024-11-12 NOTE — ASSESSMENT
[FreeTextEntry1] : 69 yo M, Cantonese speaking, former smoker (1ppd x 25yrs, quit x 13yrs), with PMHX of HTN and HLD who was referred by his PCP (Dr. Velvet Finnegan) for evaluation of a lingular 8.1x5.3 cm mass with pleural effusion and mediastinal lymphadenopathy seen on surveillance CT.   On 07/26/2024, patient underwent flexible bronchoscopy with aspiration of secretions. Left video-assisted thoracoscopic surgery, robotic assisted. Pleural biopsy. Drainage of left upper lobe lung abscess. Total decortication of left lung and drainage of loculated empyema. Surgical pathology showed the left plural biopsies are positive for metastatic squamous cell carcinoma. Patient has stage IV disease.    He is now about 3 months s/p surgery, presents today for a follow up visit to reassess his recovery. Pt reports doing well, undergoing chemotherapy with Dr. Ramírez, has a scheduled CT scan in Nov. He is stable from the surgical standpoint.  I suggested him to continue treatment with oncologist. Follow up with me as needed.  All questions were answered to his satisfaction.  Plan:  follow up CTS as needed.   I, Dr. Román Villar MD, personally performed the evaluation and management (E/M) services for this established patient who presents today with (a) new problem(s)/exacerbation of (an) existing condition(s).  That E/M includes conducting the clinically appropriate interval history &/or exam, assessing all new/exacerbated conditions, and establishing a new plan of care.  Today, my NP, Sinai Nascimento, was here to observe &/or participate in the visit & follow plan of care established by me.

## 2024-11-12 NOTE — CONSULT LETTER
[Dear  ___] : Dear  [unfilled], [Consult Letter:] : I had the pleasure of evaluating your patient, [unfilled]. [Please see my note below.] : Please see my note below. [Consult Closing:] : Thank you very much for allowing me to participate in the care of this patient.  If you have any questions, please do not hesitate to contact me. [Sincerely,] : Sincerely, [FreeTextEntry3] : Román Villar MD Professor, Cardiovascular & Thoracic Surgery [facility] [employer] Director of the [facility]  Director of Thoracic Surgery, [address] [place] [address] Phone: [phone] Fax: [phone]

## 2024-11-12 NOTE — CONSULT LETTER
[Dear  ___] : Dear  [unfilled], [Consult Letter:] : I had the pleasure of evaluating your patient, [unfilled]. [Please see my note below.] : Please see my note below. [Consult Closing:] : Thank you very much for allowing me to participate in the care of this patient.  If you have any questions, please do not hesitate to contact me. [Sincerely,] : Sincerely, [FreeTextEntry3] : Román Villar MD Professor, Cardiovascular & Thoracic Surgery [facility] [employer] Director of the [facility]  Director of Thoracic Surgery, [address] [place] [address] Phone: [phone] Fax: [phone]

## 2024-11-12 NOTE — CONSULT LETTER
[Dear  ___] : Dear  [unfilled], [Consult Letter:] : I had the pleasure of evaluating your patient, [unfilled]. [Please see my note below.] : Please see my note below. [Consult Closing:] : Thank you very much for allowing me to participate in the care of this patient.  If you have any questions, please do not hesitate to contact me. [Sincerely,] : Sincerely, [FreeTextEntry3] : Román Villar MD Professor, Cardiovascular & Thoracic Surgery [facility] [employer] Director of the [facility]  Director of Thoracic Surgery, [address] [place] [address] Phone: [phone] Fax: [phone]

## 2024-11-12 NOTE — CONSULT LETTER
[Dear  ___] : Dear  [unfilled], [Consult Letter:] : I had the pleasure of evaluating your patient, [unfilled]. [Please see my note below.] : Please see my note below. [Consult Closing:] : Thank you very much for allowing me to participate in the care of this patient.  If you have any questions, please do not hesitate to contact me. [Sincerely,] : Sincerely, [FreeTextEntry3] : Román Villar MD Professor, Cardiovascular & Thoracic Surgery [facility] [employer] Director of the [facility]  Director of Thoracic Surgery, [address] [place] [address] Phone: [phone] Fax: [phone]

## 2024-11-12 NOTE — ASSESSMENT
[FreeTextEntry1] : 67 yo M, Cantonese speaking, former smoker (1ppd x 25yrs, quit x 13yrs), with PMHX of HTN and HLD who was referred by his PCP (Dr. Velvet Finnegan) for evaluation of a lingular 8.1x5.3 cm mass with pleural effusion and mediastinal lymphadenopathy seen on surveillance CT.   On 07/26/2024, patient underwent flexible bronchoscopy with aspiration of secretions. Left video-assisted thoracoscopic surgery, robotic assisted. Pleural biopsy. Drainage of left upper lobe lung abscess. Total decortication of left lung and drainage of loculated empyema. Surgical pathology showed the left plural biopsies are positive for metastatic squamous cell carcinoma. Patient has stage IV disease.    He is now about 3 months s/p surgery, presents today for a follow up visit to reassess his recovery. Pt reports doing well, undergoing chemotherapy with Dr. Ramírez, has a scheduled CT scan in Nov. He is stable from the surgical standpoint.  I suggested him to continue treatment with oncologist. Follow up with me as needed.  All questions were answered to his satisfaction.  Plan:  follow up CTS as needed.   I, Dr. Román Villar MD, personally performed the evaluation and management (E/M) services for this established patient who presents today with (a) new problem(s)/exacerbation of (an) existing condition(s).  That E/M includes conducting the clinically appropriate interval history &/or exam, assessing all new/exacerbated conditions, and establishing a new plan of care.  Today, my NP, Sinai Nascimento, was here to observe &/or participate in the visit & follow plan of care established by me.

## 2024-11-12 NOTE — HISTORY OF PRESENT ILLNESS
[FreeTextEntry1] : 69 yo M, Cantonese speaking, former smoker (1ppd x 25yrs, quit x 13yrs), with PMHX of HTN and HLD who was referred by his PCP (Dr. Velvet Finnegan) for evaluation of a lingular 8.1x5.3 cm mass with pleural effusion and mediastinal lymphadenopathy seen on surveillance CT.   On 07/26/2024, patient underwent flexible bronchoscopy with aspiration of secretions. Left video-assisted thoracoscopic surgery, robotic assisted. Pleural biopsy. Drainage of left upper lobe lung abscess. Total decortication of left lung and drainage of loculated empyema. Surgical pathology showed the left plural biopsies are positive for metastatic squamous cell carcinoma, along with chronic inflammation, ulceration, and fibrin deposition. Patient has stage IV disease and is following oncology Dr. Ramírez treatment. He started chemotherapy on 09/12/2024. (carboplatin/Abraxane/pembrolizumab).   He is now about 3 months s/p surgery, presents today for a follow up visit to reassess his recovery. Pt reports doing well, tolerating the treatment so far. He denies fatigue, anorexia, chest pain, shortness of breath, or body aches.   CT C/A/P on 09/03/2024 - Marked worsening of pleural-parenchymal malignant process in the left hemithorax with enlargement of the existing masses and appearance of new pleural nodules. - Worsening mediastinal lymphadenopathy. - Small bone lesion in the left eighth rib with nondisplaced pathologic fracture due to direct pleural or distant metastasis.

## 2024-11-12 NOTE — PHYSICAL EXAM
[Decreased Breath Sounds] : decreased breath sounds [Heart Rate And Rhythm] : heart rate was normal and rhythm regular [Heart Sounds] : normal S1 and S2 [Heart Sounds Gallop] : no gallops [Murmurs] : no murmurs [Heart Sounds Pericardial Friction Rub] : no pericardial rub [Oriented To Time, Place, And Person] : oriented to person, place, and time [Impaired Insight] : insight and judgment were intact [Affect] : the affect was normal

## 2024-11-12 NOTE — HISTORY OF PRESENT ILLNESS
[FreeTextEntry1] : 67 yo M, Cantonese speaking, former smoker (1ppd x 25yrs, quit x 13yrs), with PMHX of HTN and HLD who was referred by his PCP (Dr. Velvet Finnegan) for evaluation of a lingular 8.1x5.3 cm mass with pleural effusion and mediastinal lymphadenopathy seen on surveillance CT.   On 07/26/2024, patient underwent flexible bronchoscopy with aspiration of secretions. Left video-assisted thoracoscopic surgery, robotic assisted. Pleural biopsy. Drainage of left upper lobe lung abscess. Total decortication of left lung and drainage of loculated empyema. Surgical pathology showed the left plural biopsies are positive for metastatic squamous cell carcinoma, along with chronic inflammation, ulceration, and fibrin deposition. Patient has stage IV disease and is following oncology Dr. Ramírez treatment. He started chemotherapy on 09/12/2024. (carboplatin/Abraxane/pembrolizumab).   He is now about 3 months s/p surgery, presents today for a follow up visit to reassess his recovery. Pt reports doing well, tolerating the treatment so far. He denies fatigue, anorexia, chest pain, shortness of breath, or body aches.   CT C/A/P on 09/03/2024 - Marked worsening of pleural-parenchymal malignant process in the left hemithorax with enlargement of the existing masses and appearance of new pleural nodules. - Worsening mediastinal lymphadenopathy. - Small bone lesion in the left eighth rib with nondisplaced pathologic fracture due to direct pleural or distant metastasis.

## 2024-11-25 NOTE — HISTORY OF PRESENT ILLNESS
[de-identified] : Gildardo Hawkins is a Cantonese speaking 67-year-old male with PMHx of HTN, HLD who presents to the clinic for hospital follow up of stage IV NSCLC - SCC histology, PD-L1 25%.  Onc hx: He had a recent hospitalization for syncope, was found to have left lung lingular mass with central necrosis and pleural effusion along with several LNodes. He was also found to have empyema and has been on antibiotics since.   7/8/24 CT scan showed 8.4 cm lingular mass with central necrosis and pleural effusion. He was also found to have a  2.3 cm left lateral pleural mass that abuts the lingular mass. He was also found to have persistent prevascular hypermetabolic chip mass~ 5.8 cm in diameter with central necrosis. This abuts a 2.2 cm aortopulmonary hypermetabolic node which is exerting mass effect on trachea. 1.3 cm R upper paratracheal mildly hypermetabolic node, 1.4 cm precarinal hypermetabolic node, 3.4 cm left hilar hypermetabolic node were also present.   7/26/24: Surgical Pathology Report  Left pleural and upper lobe biopsy showed Metastatic squamous cell carcinoma present in pleural tissue, and  marked fibrinous pleuritis with severe acute and chronic inflammation, ulceration, and fibrin deposition  - Left pleural fluid analysis was POSITIVE FOR MALIGNANT CELLS. Favor squamous cell carcinoma Tier II: Variants of Potential Clinical Significance CDKN2A p.([id number]) TP53 p.([id number]) Tier III: Variants of Unknown Clinical Significance ATR p.([id number]) TMB-high PDL-1 25%  7/16/24 MRI Brain: No hemorrhage, mass, midline shift or infarction. No abnormal enhancement. Mild white matter disease likely mild chronic microvascular ischemic disease.  7/24/24: PET/CT: Hypermetablic lingular mass with central necrosis, compatible with neoplasm. Persistent moderate left pleural effusion. Areas of increased FDG uptake in the left pleura, suspicious neoplastic involvement; most prominently, 2.3 cm left lateral pleural hypermetabolic mass. Mediastinal (including in the upper right paratracheal chip station) and left hilar hypermetabolic lymphadenopathy, compatible with metastatic neoplasm. The largest of these is perivascular lymph chip mass with necrosis.  Mildly thickened left adrenal gland with foci of increased FDG uptake; metastases cannot be excluded. Mildly thickened right adrenal gland, without abnormal FDG uptake. Medialization of the left vocal cord, which may be seen with left vocal cord paralysis. This may be related to the dominant mediastinal mass. Persistent peripheral left lower lobe airspace opacities without significant FDG uptake. This may be due to compressive atelectasis from the aforementioned left pleural effusion.   8/9/24: XR Chest: There is peripheral repositioning of a left-sided PICC the tip located in the midsuperior vena cava. The cardiac mediastinal and hilar contours are unchanged. Again noted is increased opacity over the left lower lung zone with silhouetting of the left heart border. There may be some air bronchograms and a parenchymal pneumonia/atelectasis unchanged from prior imaging cannot be excluded. There is left hemidiaphragm elevation. No pneumothorax.  9/3/24: CT Chest/Abdomen/Pelvis:  Marked worsening of pleural-parenchymal malignant process in the left hemithorax with enlargement of the existing masses and appearance of new pleural nodules. Worsening mediastinal lymphadenopathy. Small bone lesion in the left eighth rib with nondisplaced pathologic fracture due to direct pleural or distant metastasis.  11/20/2024: CT chest/abdomen/pelvis: Read pending [de-identified] : squamous cell carcinoma - PDL-1 25%, CKDN2A [de-identified] : CTSx:   [FreeTextEntry1] : - 9/12/2024: C1D1 carboplatin/Abraxane/pembrolizumab 9/19/2024: C1D8 Abraxane 9/26/2024: C1D15 Abraxane 10/14/2024: C2D1 Carboplatin/abraxane/pembrolizumab 10/21/2024: C2D8 abraxane 10/28/2024: C2D15 abraxane 11/4/2024: C3D1 carboplatin/Abraxane/pembrolizumab 11/25/2024: C4D1 carboplatin/Abraxane/pembrolizumab [de-identified] : The patient reports having very good appetite.  He offers no complaints. [FreeTextEntry3] : Diarrhea

## 2024-11-25 NOTE — REVIEW OF SYSTEMS
[Fever] : no fever [Chills] : no chills [Night Sweats] : no night sweats [Fatigue] : no fatigue [Vision Problems] : no vision problems [Dysphagia] : no dysphagia [Odynophagia] : no odynophagia [Chest Pain] : no chest pain [Palpitations] : no palpitations [Shortness Of Breath] : no shortness of breath [Wheezing] : no wheezing [Abdominal Pain] : no abdominal pain [Vomiting] : no vomiting [Dysuria] : no dysuria [Joint Pain] : no joint pain [Muscle Pain] : no muscle pain [Skin Rash] : no skin rash [Fainting] : no fainting [Easy Bleeding] : no tendency for easy bleeding [Easy Bruising] : no tendency for easy bruising [FreeTextEntry2] : good appetite [FreeTextEntry7] : diarrhea improved

## 2024-11-25 NOTE — ASSESSMENT
[FreeTextEntry1] : Mr. Gildardo Hawkins is a Cantonese speaking 68-year-old male with PMHx of HTN, HLD who presents to the clinic for f/u of Stage IV squamous cell carcinoma.     # stage IV squamous cell carcinoma (based on malignant pleural effusion)  --Liquid NGS testing showed PTEN R159S and [id number] positive, MSI high not detected --Tumor NGS PDL-1 25%, TMB-high, CDKN2A -- started carboplatin/Abraxane/pembrolizumab on 9/12/24 --Status post 3 cycles of chemoimmunotherapy, CT chest abdomen pelvis performed, read still pending-reviewed images independently.  Significant improvement in left upper lobe and left lower lobe mass with adjacent mass effect to the trachea.  No evidence of new metastases per my review.  Read pending. --Ordered CBC, CMP, TSH today -- patient is on therapy with carboplatin/Abraxane/pembrolizumab requiring intensive monitoring for toxicity such as RALPH, transaminitis with CBC, CMP weekly & TSH every 6 weeks --Will complete 4 cycles of induction chemoimmunotherapy, followed by maintenance immunotherapy per keynote 189  # white coat hypertension --on losartan and amlodipine --monitor BP at home, SBP is 140s range  #r/o anxiety/depression --referring to Dr. Oliver for further evaluation  RTC in 3 wks

## 2024-11-25 NOTE — PHYSICAL EXAM
[Ulcers] : no ulcers [Mucositis] : no mucositis [Thrush] : no thrush [de-identified] : supple [de-identified] : no cervical or supraclavicular lymphadenopathy appreciated

## 2024-11-25 NOTE — ASSESSMENT
[FreeTextEntry1] : Mr. Gildardo Hawkins is a Cantonese speaking 68-year-old male with PMHx of HTN, HLD who presents to the clinic for f/u of Stage IV squamous cell carcinoma.     # stage IV squamous cell carcinoma (based on malignant pleural effusion)  --Liquid NGS testing showed PTEN R159S and [id number] positive, MSI high not detected --Tumor NGS PDL-1 25%, TMB-high, CDKN2A -- started carboplatin/Abraxane/pembrolizumab on 9/12/24 --Status post 3 cycles of chemoimmunotherapy, CT chest abdomen pelvis performed, read still pending-reviewed images independently.  Significant improvement in left upper lobe and left lower lobe mass with adjacent mass effect to the trachea.  No evidence of new metastases per my review.  Read pending. --Ordered CBC, CMP, TSH today -- patient is on therapy with carboplatin/Abraxane/pembrolizumab requiring intensive monitoring for toxicity such as RALPH, transaminitis with CBC, CMP weekly & TSH every 6 weeks --Will complete 4 cycles of induction chemoimmunotherapy, followed by maintenance immunotherapy per keynote 189  # white coat hypertension --on losartan and amlodipine --monitor BP at home, SBP is 140s range  #r/o anxiety/depression --referring to Dr. Oliver for further evaluation  RTC in 3 wks

## 2024-11-25 NOTE — HISTORY OF PRESENT ILLNESS
[de-identified] : Gildardo Hawkins is a Cantonese speaking 67-year-old male with PMHx of HTN, HLD who presents to the clinic for hospital follow up of stage IV NSCLC - SCC histology, PD-L1 25%.  Onc hx: He had a recent hospitalization for syncope, was found to have left lung lingular mass with central necrosis and pleural effusion along with several LNodes. He was also found to have empyema and has been on antibiotics since.   7/8/24 CT scan showed 8.4 cm lingular mass with central necrosis and pleural effusion. He was also found to have a  2.3 cm left lateral pleural mass that abuts the lingular mass. He was also found to have persistent prevascular hypermetabolic chip mass~ 5.8 cm in diameter with central necrosis. This abuts a 2.2 cm aortopulmonary hypermetabolic node which is exerting mass effect on trachea. 1.3 cm R upper paratracheal mildly hypermetabolic node, 1.4 cm precarinal hypermetabolic node, 3.4 cm left hilar hypermetabolic node were also present.   7/26/24: Surgical Pathology Report  Left pleural and upper lobe biopsy showed Metastatic squamous cell carcinoma present in pleural tissue, and  marked fibrinous pleuritis with severe acute and chronic inflammation, ulceration, and fibrin deposition  - Left pleural fluid analysis was POSITIVE FOR MALIGNANT CELLS. Favor squamous cell carcinoma Tier II: Variants of Potential Clinical Significance CDKN2A p.([id number]) TP53 p.([id number]) Tier III: Variants of Unknown Clinical Significance ATR p.([id number]) TMB-high PDL-1 25%  7/16/24 MRI Brain: No hemorrhage, mass, midline shift or infarction. No abnormal enhancement. Mild white matter disease likely mild chronic microvascular ischemic disease.  7/24/24: PET/CT: Hypermetablic lingular mass with central necrosis, compatible with neoplasm. Persistent moderate left pleural effusion. Areas of increased FDG uptake in the left pleura, suspicious neoplastic involvement; most prominently, 2.3 cm left lateral pleural hypermetabolic mass. Mediastinal (including in the upper right paratracheal chip station) and left hilar hypermetabolic lymphadenopathy, compatible with metastatic neoplasm. The largest of these is perivascular lymph chip mass with necrosis.  Mildly thickened left adrenal gland with foci of increased FDG uptake; metastases cannot be excluded. Mildly thickened right adrenal gland, without abnormal FDG uptake. Medialization of the left vocal cord, which may be seen with left vocal cord paralysis. This may be related to the dominant mediastinal mass. Persistent peripheral left lower lobe airspace opacities without significant FDG uptake. This may be due to compressive atelectasis from the aforementioned left pleural effusion.   8/9/24: XR Chest: There is peripheral repositioning of a left-sided PICC the tip located in the midsuperior vena cava. The cardiac mediastinal and hilar contours are unchanged. Again noted is increased opacity over the left lower lung zone with silhouetting of the left heart border. There may be some air bronchograms and a parenchymal pneumonia/atelectasis unchanged from prior imaging cannot be excluded. There is left hemidiaphragm elevation. No pneumothorax.  9/3/24: CT Chest/Abdomen/Pelvis:  Marked worsening of pleural-parenchymal malignant process in the left hemithorax with enlargement of the existing masses and appearance of new pleural nodules. Worsening mediastinal lymphadenopathy. Small bone lesion in the left eighth rib with nondisplaced pathologic fracture due to direct pleural or distant metastasis.  11/20/2024: CT chest/abdomen/pelvis: Read pending [de-identified] : squamous cell carcinoma - PDL-1 25%, CKDN2A [de-identified] : CTSx:   [FreeTextEntry1] : - 9/12/2024: C1D1 carboplatin/Abraxane/pembrolizumab 9/19/2024: C1D8 Abraxane 9/26/2024: C1D15 Abraxane 10/14/2024: C2D1 Carboplatin/abraxane/pembrolizumab 10/21/2024: C2D8 abraxane 10/28/2024: C2D15 abraxane 11/4/2024: C3D1 carboplatin/Abraxane/pembrolizumab 11/25/2024: C4D1 carboplatin/Abraxane/pembrolizumab [de-identified] : The patient reports having very good appetite.  He offers no complaints. [FreeTextEntry3] : Diarrhea

## 2024-11-25 NOTE — PHYSICAL EXAM
[Ulcers] : no ulcers [Mucositis] : no mucositis [Thrush] : no thrush [de-identified] : supple [de-identified] : no cervical or supraclavicular lymphadenopathy appreciated

## 2024-12-16 NOTE — PHYSICAL EXAM
[Ulcers] : no ulcers [Mucositis] : no mucositis [Thrush] : no thrush [Normal] : no peripheral adenopathy appreciated [de-identified] : supple [de-identified] : no cervical or supraclavicular lymphadenopathy appreciated

## 2024-12-16 NOTE — HISTORY OF PRESENT ILLNESS
[Disease: _____________________] : Disease: [unfilled] [M: ___] : M[unfilled] [AJCC Stage: ____] : AJCC Stage: [unfilled] [Therapy: ___] : Therapy: [unfilled] [1] : 1, Mild [90: Able to carry normal activity; minor signs or symptoms of disease.] : 90: Able to carry normal activity; minor signs or symptoms of disease.  [ECOG Performance Status: 1 - Restricted in physically strenuous activity but ambulatory and able to carry out work of a light or sedentary nature] : Performance Status: 1 - Restricted in physically strenuous activity but ambulatory and able to carry out work of a light or sedentary nature, e.g., light house work, office work [de-identified] : Gildardo Hawkins is a Cantonese speaking 67-year-old male with PMHx of HTN, HLD who presents to the clinic for hospital follow up of stage IV NSCLC - SCC histology, PD-L1 25%.  Onc hx: He had a recent hospitalization for syncope, was found to have left lung lingular mass with central necrosis and pleural effusion along with several LNodes. He was also found to have empyema and has been on antibiotics since.   7/8/24 CT scan showed 8.4 cm lingular mass with central necrosis and pleural effusion. He was also found to have a  2.3 cm left lateral pleural mass that abuts the lingular mass. He was also found to have persistent prevascular hypermetabolic chip mass~ 5.8 cm in diameter with central necrosis. This abuts a 2.2 cm aortopulmonary hypermetabolic node which is exerting mass effect on trachea. 1.3 cm R upper paratracheal mildly hypermetabolic node, 1.4 cm precarinal hypermetabolic node, 3.4 cm left hilar hypermetabolic node were also present.   7/26/24: Surgical Pathology Report  Left pleural and upper lobe biopsy showed Metastatic squamous cell carcinoma present in pleural tissue, and  marked fibrinous pleuritis with severe acute and chronic inflammation, ulceration, and fibrin deposition  - Left pleural fluid analysis was POSITIVE FOR MALIGNANT CELLS. Favor squamous cell carcinoma Tier II: Variants of Potential Clinical Significance CDKN2A p.([id number]) TP53 p.([id number]) Tier III: Variants of Unknown Clinical Significance ATR p.([id number]) TMB-high PDL-1 25%  7/16/24 MRI Brain: No hemorrhage, mass, midline shift or infarction. No abnormal enhancement. Mild white matter disease likely mild chronic microvascular ischemic disease.  7/24/24: PET/CT: Hypermetablic lingular mass with central necrosis, compatible with neoplasm. Persistent moderate left pleural effusion. Areas of increased FDG uptake in the left pleura, suspicious neoplastic involvement; most prominently, 2.3 cm left lateral pleural hypermetabolic mass. Mediastinal (including in the upper right paratracheal chip station) and left hilar hypermetabolic lymphadenopathy, compatible with metastatic neoplasm. The largest of these is perivascular lymph chip mass with necrosis.  Mildly thickened left adrenal gland with foci of increased FDG uptake; metastases cannot be excluded. Mildly thickened right adrenal gland, without abnormal FDG uptake. Medialization of the left vocal cord, which may be seen with left vocal cord paralysis. This may be related to the dominant mediastinal mass. Persistent peripheral left lower lobe airspace opacities without significant FDG uptake. This may be due to compressive atelectasis from the aforementioned left pleural effusion.   8/9/24: XR Chest: There is peripheral repositioning of a left-sided PICC the tip located in the midsuperior vena cava. The cardiac mediastinal and hilar contours are unchanged. Again noted is increased opacity over the left lower lung zone with silhouetting of the left heart border. There may be some air bronchograms and a parenchymal pneumonia/atelectasis unchanged from prior imaging cannot be excluded. There is left hemidiaphragm elevation. No pneumothorax.  9/3/24: CT Chest/Abdomen/Pelvis:  Marked worsening of pleural-parenchymal malignant process in the left hemithorax with enlargement of the existing masses and appearance of new pleural nodules. Worsening mediastinal lymphadenopathy. Small bone lesion in the left eighth rib with nondisplaced pathologic fracture due to direct pleural or distant metastasis.  11/20/2024: CT chest/abdomen/pelvis:  1.  The previously noted occluded left upper lobe bronchus is now patent. 2.  Decreased size of all of the previously noted pulmonary nodule/masses as specified above. 3.  Indeterminate small new sclerotic focus in the anterior aspect of the left third rib; otherwise, no new sites of disease 4.  Decreased size of previously noted adrenal nodules. [de-identified] : squamous cell carcinoma - PDL-1 25%, CKDN2A [de-identified] : CTSx:   [FreeTextEntry1] : - 9/12/2024: C1D1 carboplatin/Abraxane/pembrolizumab 9/19/2024: C1D8 Abraxane 9/26/2024: C1D15 Abraxane 10/14/2024: C2D1 Carboplatin/abraxane/pembrolizumab 10/21/2024: C2D8 abraxane 10/28/2024: C2D15 abraxane 11/4/2024: C3D1 carboplatin/Abraxane/pembrolizumab 11/25/2024: C4D1 carboplatin/Abraxane/pembrolizumab 12/16/2024 C1 maintenance pembrolizumab 400 mg (Q 6 wks dosing) [de-identified] : The patient reports having very good appetite.  He offers no complaints. [FreeTextEntry3] : Diarrhea

## 2024-12-16 NOTE — ASSESSMENT
[FreeTextEntry1] : Mr. Gildardo Hawkins is a Cantonese speaking 68-year-old male with PMHx of HTN, HLD who presents to the clinic for f/u of Stage IV squamous cell carcinoma.     # stage IV squamous cell carcinoma (based on malignant pleural effusion)  --Liquid NGS testing showed PTEN R159S and [id number] positive, MSI high not detected --Tumor NGS PDL-1 25%, TMB-high, CDKN2A -- started carboplatin/Abraxane/pembrolizumab on 9/12/24 --Status post 3 cycles of chemoimmunotherapy, CT chest abdomen pelvis performed showing excellent partial response --can start maintenance pemrbolizumab (400 mg Q 6 wks dosing) --Ordered CBC, CMP, TSH today -- patient is on therapy with pembrolizumab requiring intensive monitoring for toxicity such as RALPH, transaminitis with CBC, CMP & TSH every 6 weeks --RTC in 6 wks for next tx  # white coat hypertension --on losartan and amlodipine --monitor BP at home, SBP is 140s range  #r/o anxiety/depression --referring to Dr. Oliver for further evaluation  RTC in 6 wks

## 2024-12-16 NOTE — PHYSICAL EXAM
[Ulcers] : no ulcers [Mucositis] : no mucositis [Thrush] : no thrush [Normal] : no peripheral adenopathy appreciated [de-identified] : supple [de-identified] : no cervical or supraclavicular lymphadenopathy appreciated

## 2024-12-16 NOTE — ASSESSMENT
[FreeTextEntry1] : Mr. Gildardo Hawkins is a Cantonese speaking 68-year-old male with PMHx of HTN, HLD who presents to the clinic for f/u of Stage IV squamous cell carcinoma.     # stage IV squamous cell carcinoma (based on malignant pleural effusion)  --Liquid NGS testing showed PTEN R159S and [id number] positive, MSI high not detected --Tumor NGS PDL-1 25%, TMB-high, CDKN2A -- started carboplatin/Abraxane/pembrolizumab on 9/12/24 --Status post 3 cycles of chemoimmunotherapy, CT chest abdomen pelvis performed showing excellent partial response --can start maintenance pemrbolizumab (400 mg Q 6 wks dosing) --Ordered CBC, CMP, TSH today -- patient is on therapy with pembrolizumab requiring intensive monitoring for toxicity such as RALPH, transaminitis with CBC, CMP & TSH every 6 weeks --RTC in 6 wks for next tx  # white coat hypertension --on losartan and amlodipine --monitor BP at home, SBP is 140s range  #r/o anxiety/depression --referring to Dr. Oliver for further evaluation  RTC in 6 wks

## 2024-12-16 NOTE — HISTORY OF PRESENT ILLNESS
[Disease: _____________________] : Disease: [unfilled] [M: ___] : M[unfilled] [AJCC Stage: ____] : AJCC Stage: [unfilled] [Therapy: ___] : Therapy: [unfilled] [1] : 1, Mild [90: Able to carry normal activity; minor signs or symptoms of disease.] : 90: Able to carry normal activity; minor signs or symptoms of disease.  [ECOG Performance Status: 1 - Restricted in physically strenuous activity but ambulatory and able to carry out work of a light or sedentary nature] : Performance Status: 1 - Restricted in physically strenuous activity but ambulatory and able to carry out work of a light or sedentary nature, e.g., light house work, office work [de-identified] : Gildardo Hawkins is a Cantonese speaking 67-year-old male with PMHx of HTN, HLD who presents to the clinic for hospital follow up of stage IV NSCLC - SCC histology, PD-L1 25%.  Onc hx: He had a recent hospitalization for syncope, was found to have left lung lingular mass with central necrosis and pleural effusion along with several LNodes. He was also found to have empyema and has been on antibiotics since.   7/8/24 CT scan showed 8.4 cm lingular mass with central necrosis and pleural effusion. He was also found to have a  2.3 cm left lateral pleural mass that abuts the lingular mass. He was also found to have persistent prevascular hypermetabolic chip mass~ 5.8 cm in diameter with central necrosis. This abuts a 2.2 cm aortopulmonary hypermetabolic node which is exerting mass effect on trachea. 1.3 cm R upper paratracheal mildly hypermetabolic node, 1.4 cm precarinal hypermetabolic node, 3.4 cm left hilar hypermetabolic node were also present.   7/26/24: Surgical Pathology Report  Left pleural and upper lobe biopsy showed Metastatic squamous cell carcinoma present in pleural tissue, and  marked fibrinous pleuritis with severe acute and chronic inflammation, ulceration, and fibrin deposition  - Left pleural fluid analysis was POSITIVE FOR MALIGNANT CELLS. Favor squamous cell carcinoma Tier II: Variants of Potential Clinical Significance CDKN2A p.([id number]) TP53 p.([id number]) Tier III: Variants of Unknown Clinical Significance ATR p.([id number]) TMB-high PDL-1 25%  7/16/24 MRI Brain: No hemorrhage, mass, midline shift or infarction. No abnormal enhancement. Mild white matter disease likely mild chronic microvascular ischemic disease.  7/24/24: PET/CT: Hypermetablic lingular mass with central necrosis, compatible with neoplasm. Persistent moderate left pleural effusion. Areas of increased FDG uptake in the left pleura, suspicious neoplastic involvement; most prominently, 2.3 cm left lateral pleural hypermetabolic mass. Mediastinal (including in the upper right paratracheal chip station) and left hilar hypermetabolic lymphadenopathy, compatible with metastatic neoplasm. The largest of these is perivascular lymph chip mass with necrosis.  Mildly thickened left adrenal gland with foci of increased FDG uptake; metastases cannot be excluded. Mildly thickened right adrenal gland, without abnormal FDG uptake. Medialization of the left vocal cord, which may be seen with left vocal cord paralysis. This may be related to the dominant mediastinal mass. Persistent peripheral left lower lobe airspace opacities without significant FDG uptake. This may be due to compressive atelectasis from the aforementioned left pleural effusion.   8/9/24: XR Chest: There is peripheral repositioning of a left-sided PICC the tip located in the midsuperior vena cava. The cardiac mediastinal and hilar contours are unchanged. Again noted is increased opacity over the left lower lung zone with silhouetting of the left heart border. There may be some air bronchograms and a parenchymal pneumonia/atelectasis unchanged from prior imaging cannot be excluded. There is left hemidiaphragm elevation. No pneumothorax.  9/3/24: CT Chest/Abdomen/Pelvis:  Marked worsening of pleural-parenchymal malignant process in the left hemithorax with enlargement of the existing masses and appearance of new pleural nodules. Worsening mediastinal lymphadenopathy. Small bone lesion in the left eighth rib with nondisplaced pathologic fracture due to direct pleural or distant metastasis.  11/20/2024: CT chest/abdomen/pelvis:  1.  The previously noted occluded left upper lobe bronchus is now patent. 2.  Decreased size of all of the previously noted pulmonary nodule/masses as specified above. 3.  Indeterminate small new sclerotic focus in the anterior aspect of the left third rib; otherwise, no new sites of disease 4.  Decreased size of previously noted adrenal nodules. [de-identified] : squamous cell carcinoma - PDL-1 25%, CKDN2A [de-identified] : CTSx:   [FreeTextEntry1] : - 9/12/2024: C1D1 carboplatin/Abraxane/pembrolizumab 9/19/2024: C1D8 Abraxane 9/26/2024: C1D15 Abraxane 10/14/2024: C2D1 Carboplatin/abraxane/pembrolizumab 10/21/2024: C2D8 abraxane 10/28/2024: C2D15 abraxane 11/4/2024: C3D1 carboplatin/Abraxane/pembrolizumab 11/25/2024: C4D1 carboplatin/Abraxane/pembrolizumab 12/16/2024 C1 maintenance pembrolizumab 400 mg (Q 6 wks dosing) [de-identified] : The patient reports having very good appetite.  He offers no complaints. [FreeTextEntry3] : Diarrhea

## 2024-12-20 NOTE — PHYSICAL EXAM
[Normal] : mucosa is normal [Midline] : trachea located in midline position [FreeTextEntry1] : Hoarse but functional voice with normal projection reduced range reduced pitch control

## 2024-12-20 NOTE — PROCEDURE
[de-identified] :  - Procedure Note Pre-operative Diagnosis: Dysphonia - post bilateral vocal fold augmentation Post-operative Diagnosis: Left vocal fold paralysis  Anesthesia: Topical - 1 % Lidocaine/Phenylephrine Procedure:  Flexible Laryngoscopy with Stroboscopy - [facility] [id number]  Procedure Details:   The patient was placed in the sitting position.  After decongestant and anesthesia were applied the laryngoscope was passed.  The nasal cavities, nasopharynx, oropharynx, hypopharynx, and larynx were all examined.  Vocal folds were examined during respiration and phonation.  The following findings were noted:   Findings:  Nose: Septum is midline, turbinates are normal, nasal airways patent, mucosa normal Nasopharynx: Adenoids normal, no masses, eustachian tube normal Oropharynx: Pharyngeal walls symmetric and without lesion. Tonsils/fossae symmetric Hypopharynx: Hypopharynx and pyriform sinuses without lesion. No masses or asymmetry.  No pooling of secretions. Larynx:  Epiglottis and aryepiglottic folds were sharp and crisp bilaterally.  Bilateral false vocal folds normal appearance. Airway was widely patent.   Strobe Exam Ratings   TVF Appearance:  normal TVF Mobility: normal on the right paralyzed on the left Edema/hypertrophy: normal Mucus on TVF: normal Glottic Closure:  +small glottic gap  Mucosal Wave:  improved Amplitude of Vibration:  improved Phase: symmetric Supraglottic Hyperfunction:  minimal Other Findings: none Condition: Stable.  Patient tolerated procedure well.  Complications: None  -----------------------------------------------------------------   Procedure: Pharyngeal and speech evaluation, by cine or video - CPT 78144  Voice assessment was recorded via video recording on this date.   Clarity: Slightly hoarse and breathy, but very much functional Range: Reduced Pitch Control: Reduced Projection: Mildly reduced Tremor: none Cough: none   Condition: Stable.  Patient tolerated procedure well. Complications: None

## 2024-12-20 NOTE — ASSESSMENT
[FreeTextEntry1] :  - 8/23/24 68 yo M, Cantonese speaking, former smoker (1ppd x 25yrs, quit x 13yrs), with PMHX of HTN and HLD who was referred by his PCP (Dr. Velvet Finnegan) for evaluation of a lingular mass with pleural effusion and mediastinal lymphadenopathy seen on surveillance CT. patient is now status post left video-assisted thorascopic surgery, robotically assisted with pleural biopsy and drainage of an left upper lobe lung abscess. Sampling was positive for squamous of carcinoma.  During hospitalization last month patient found to have a left-sided vocal fold paralysis.  We performed bilateral vocal fold injection augmentation and since that time patient has done well with an improved functional voice improved breath support and no issues with swallowing.  Examination today shows that the left vocal fold appears to be well augmented with glottic closure.  We do lengthy discussion including waiting 4 to 6 months to see if there is any recovery of the movement of the vocal folds.  At that time we will consider repeat injection augmentation versus thyroplasty.  Patient knows to follow-up sooner should symptoms worsen or fail to improve.  12/20/24: 5 months s/p bilateral vocal fold injection augmentation while hospitalized at [facility] for left vocal fold paralysis July 2024. Notes his voice has been stable. On physical exam he was found to have evidence of left vocal cord paralysis.  At this point I do not think there will be a return of function.  I reviewed several options at length including observation, repeat injection laryngoplasty, going to the operating room for thyroplasty.  For now patient wishes to observe.  Will follow-up in 2 to 3 months for repeat evaluation.    - Follow-up 2 to 3 months for repeat evaluation - Consider observation versus repeat injection augmentation versus thyroplasty  - Patient knows to follow-up sooner should symptoms worsen or fail to improve

## 2024-12-20 NOTE — HISTORY OF PRESENT ILLNESS
[de-identified] : - 8/23/24 66 yo M, Cantonese speaking, former smoker (1ppd x 25yrs, quit x 13yrs), with PMHX of HTN and HLD who was referred by his PCP (Dr. Velvet Finnegan) for evaluation of a lingular mass with pleural effusion and mediastinal lymphadenopathy seen on surveillance CT. patient is now status post left video-assisted thorascopic surgery, robotically assisted with pleural biopsy and drainage of an left upper lobe lung abscess.  Sampling was positive for squamous of carcinoma.  Patient with subsequent infection requiring hospitalization on IV antibiotics and improved and discharged.  During hospitalization patient was found to have dysphonia and dysphagia.  Found to have a left-sided vocal fold paralysis.  During the hospitalization I performed a vocal fold injection augmentation.  Since that time patient notes significant improvement in terms of voice and swallowing.  Voice is very functional.  Notes improvement in terms of breath support.  No issues chewing, eating, swallowing.  No ENT issues otherwise. - [FreeTextEntry1] : 12/20/24 5 months s/p bilateral vocal fold injection augmentation while hospitalized at [facility] for left vocal fold paralysis July 2024. Here for follow up evaluation of his vocal cords. He feels his voice has been stable. No new ENT issues otherwise.

## 2025-01-28 NOTE — HISTORY OF PRESENT ILLNESS
[de-identified] : Gildardo Hawkins is a Cantonese speaking 68-year-old male with PMHx of HTN, HLD who presents to the clinic for hospital follow up of stage IV NSCLC - SCC histology, PD-L1 25%.  Onc hx: He had a recent hospitalization for syncope, was found to have left lung lingular mass with central necrosis and pleural effusion along with several LNodes. He was also found to have empyema and has been on antibiotics since.  7/8/24 CT scan showed 8.4 cm lingular mass with central necrosis and pleural effusion. He was also found to have a 2.3 cm left lateral pleural mass that abuts the lingular mass. He was also found to have persistent prevascular hypermetabolic chip mass~ 5.8 cm in diameter with central necrosis. This abuts a 2.2 cm aortopulmonary hypermetabolic node which is exerting mass effect on trachea. 1.3 cm R upper paratracheal mildly hypermetabolic node, 1.4 cm precarinal hypermetabolic node, 3.4 cm left hilar hypermetabolic node were also present.  7/26/24: Surgical Pathology Report Left pleural and upper lobe biopsy showed Metastatic squamous cell carcinoma present in pleural tissue, and marked fibrinous pleuritis with severe acute and chronic inflammation, ulceration, and fibrin deposition  - Left pleural fluid analysis was POSITIVE FOR MALIGNANT CELLS. Favor squamous cell carcinoma Tier II: Variants of Potential Clinical Significance CDKN2A p.([id number]) TP53 p.([id number]) Tier III: Variants of Unknown Clinical Significance ATR p.([id number]) TMB-high PDL-1 25%  7/16/24 MRI Brain: No hemorrhage, mass, midline shift or infarction. No abnormal enhancement. Mild white matter disease likely mild chronic microvascular ischemic disease.  7/24/24: PET/CT: Hypermetablic lingular mass with central necrosis, compatible with neoplasm. Persistent moderate left pleural effusion. Areas of increased FDG uptake in the left pleura, suspicious neoplastic involvement; most prominently, 2.3 cm left lateral pleural hypermetabolic mass. Mediastinal (including in the upper right paratracheal chip station) and left hilar hypermetabolic lymphadenopathy, compatible with metastatic neoplasm. The largest of these is perivascular lymph chip mass with necrosis. Mildly thickened left adrenal gland with foci of increased FDG uptake; metastases cannot be excluded. Mildly thickened right adrenal gland, without abnormal FDG uptake. Medialization of the left vocal cord, which may be seen with left vocal cord paralysis. This may be related to the dominant mediastinal mass. Persistent peripheral left lower lobe airspace opacities without significant FDG uptake. This may be due to compressive atelectasis from the aforementioned left pleural effusion.  8/9/24: XR Chest: There is peripheral repositioning of a left-sided PICC the tip located in the midsuperior vena cava. The cardiac mediastinal and hilar contours are unchanged. Again noted is increased opacity over the left lower lung zone with silhouetting of the left heart border. There may be some air bronchograms and a parenchymal pneumonia/atelectasis unchanged from prior imaging cannot be excluded. There is left hemidiaphragm elevation. No pneumothorax.  9/3/24: CT Chest/Abdomen/Pelvis: Marked worsening of pleural-parenchymal malignant process in the left hemithorax with enlargement of the existing masses and appearance of new pleural nodules. Worsening mediastinal lymphadenopathy. Small bone lesion in the left eighth rib with nondisplaced pathologic fracture due to direct pleural or distant metastasis.  11/20/2024: CT chest/abdomen/pelvis: 1. The previously noted occluded left upper lobe bronchus is now patent. 2. Decreased size of all of the previously noted pulmonary nodule/masses as specified above. 3. Indeterminate small new sclerotic focus in the anterior aspect of the left third rib; otherwise, no new sites of disease 4. Decreased size of previously noted adrenal nodules.   Disease: NSCLC   Pathology: squamous cell carcinoma - PDL-1 25%, CKDN2A   TNM stage: M1 AJCC Stage: IV   CTSx:     Current Treatment Status: Therapy: pembrolizumab . - 9/12/2024: C1D1 carboplatin/Abraxane/pembrolizumab 9/19/2024: C1D8 Abraxane 9/26/2024: C1D15 Abraxane 10/14/2024: C2D1 Carboplatin/abraxane/pembrolizumab 10/21/2024: C2D8 abraxane 10/28/2024: C2D15 abraxane 11/4/2024: C3D1 carboplatin/Abraxane/pembrolizumab 11/25/2024: C4D1 carboplatin/Abraxane/pembrolizumab 12/16/2024 C1 maintenance pembrolizumab 400 mg (Q 6 wks dosing). 01/27 C2 maintenance pembrolizumab            [de-identified] : Feels well. Offers no complaints other than slight chronic cough.

## 2025-01-28 NOTE — REVIEW OF SYSTEMS
Following up on this request for a letter of medical necessity as the initial request was sent on 5/12/2022 by the FC's.     Thanks.   [Cough] : cough [Negative] : Psychiatric

## 2025-01-28 NOTE — ASSESSMENT
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 Gildardo Hawkins is a Cantonese speaking 68-year-old male with PMHx of HTN, HLD who presents to the clinic for f/u of Stage IV squamous cell carcinoma.  # stage IV squamous cell carcinoma (based on malignant pleural effusion) --Liquid NGS testing showed PTEN R159S and [id number] positive, MSI high not detected --Tumor NGS PDL-1 25%, TMB-high, CDKN2A -- started carboplatin/Abraxane/pembrolizumab on 9/12/24 --Status post 3 cycles of chemoimmunotherapy, CT chest abdomen pelvis performed showing excellent partial response -- cleared for C2 maintenance pembrolizumab today   -- patient is on therapy with pembrolizumab requiring intensive monitoring for toxicity such as RALPH, transaminitis with CBC, CMP & TSH every 6 weeks  # white coat hypertension --on losartan and amlodipine --monitor BP at home, SBP is 140s range   RTC in 6 wks.

## 2025-01-28 NOTE — HISTORY OF PRESENT ILLNESS
[de-identified] : Gildardo Hawkins is a Cantonese speaking 68-year-old male with PMHx of HTN, HLD who presents to the clinic for hospital follow up of stage IV NSCLC - SCC histology, PD-L1 25%.  Onc hx: He had a recent hospitalization for syncope, was found to have left lung lingular mass with central necrosis and pleural effusion along with several LNodes. He was also found to have empyema and has been on antibiotics since.  7/8/24 CT scan showed 8.4 cm lingular mass with central necrosis and pleural effusion. He was also found to have a 2.3 cm left lateral pleural mass that abuts the lingular mass. He was also found to have persistent prevascular hypermetabolic chip mass~ 5.8 cm in diameter with central necrosis. This abuts a 2.2 cm aortopulmonary hypermetabolic node which is exerting mass effect on trachea. 1.3 cm R upper paratracheal mildly hypermetabolic node, 1.4 cm precarinal hypermetabolic node, 3.4 cm left hilar hypermetabolic node were also present.  7/26/24: Surgical Pathology Report Left pleural and upper lobe biopsy showed Metastatic squamous cell carcinoma present in pleural tissue, and marked fibrinous pleuritis with severe acute and chronic inflammation, ulceration, and fibrin deposition  - Left pleural fluid analysis was POSITIVE FOR MALIGNANT CELLS. Favor squamous cell carcinoma Tier II: Variants of Potential Clinical Significance CDKN2A p.([id number]) TP53 p.([id number]) Tier III: Variants of Unknown Clinical Significance ATR p.([id number]) TMB-high PDL-1 25%  7/16/24 MRI Brain: No hemorrhage, mass, midline shift or infarction. No abnormal enhancement. Mild white matter disease likely mild chronic microvascular ischemic disease.  7/24/24: PET/CT: Hypermetablic lingular mass with central necrosis, compatible with neoplasm. Persistent moderate left pleural effusion. Areas of increased FDG uptake in the left pleura, suspicious neoplastic involvement; most prominently, 2.3 cm left lateral pleural hypermetabolic mass. Mediastinal (including in the upper right paratracheal chip station) and left hilar hypermetabolic lymphadenopathy, compatible with metastatic neoplasm. The largest of these is perivascular lymph chip mass with necrosis. Mildly thickened left adrenal gland with foci of increased FDG uptake; metastases cannot be excluded. Mildly thickened right adrenal gland, without abnormal FDG uptake. Medialization of the left vocal cord, which may be seen with left vocal cord paralysis. This may be related to the dominant mediastinal mass. Persistent peripheral left lower lobe airspace opacities without significant FDG uptake. This may be due to compressive atelectasis from the aforementioned left pleural effusion.  8/9/24: XR Chest: There is peripheral repositioning of a left-sided PICC the tip located in the midsuperior vena cava. The cardiac mediastinal and hilar contours are unchanged. Again noted is increased opacity over the left lower lung zone with silhouetting of the left heart border. There may be some air bronchograms and a parenchymal pneumonia/atelectasis unchanged from prior imaging cannot be excluded. There is left hemidiaphragm elevation. No pneumothorax.  9/3/24: CT Chest/Abdomen/Pelvis: Marked worsening of pleural-parenchymal malignant process in the left hemithorax with enlargement of the existing masses and appearance of new pleural nodules. Worsening mediastinal lymphadenopathy. Small bone lesion in the left eighth rib with nondisplaced pathologic fracture due to direct pleural or distant metastasis.  11/20/2024: CT chest/abdomen/pelvis: 1. The previously noted occluded left upper lobe bronchus is now patent. 2. Decreased size of all of the previously noted pulmonary nodule/masses as specified above. 3. Indeterminate small new sclerotic focus in the anterior aspect of the left third rib; otherwise, no new sites of disease 4. Decreased size of previously noted adrenal nodules.   Disease: NSCLC   Pathology: squamous cell carcinoma - PDL-1 25%, CKDN2A   TNM stage: M1 AJCC Stage: IV   CTSx:     Current Treatment Status: Therapy: pembrolizumab . - 9/12/2024: C1D1 carboplatin/Abraxane/pembrolizumab 9/19/2024: C1D8 Abraxane 9/26/2024: C1D15 Abraxane 10/14/2024: C2D1 Carboplatin/abraxane/pembrolizumab 10/21/2024: C2D8 abraxane 10/28/2024: C2D15 abraxane 11/4/2024: C3D1 carboplatin/Abraxane/pembrolizumab 11/25/2024: C4D1 carboplatin/Abraxane/pembrolizumab 12/16/2024 C1 maintenance pembrolizumab 400 mg (Q 6 wks dosing). 01/27 C2 maintenance pembrolizumab            [de-identified] : Feels well. Offers no complaints other than slight chronic cough.

## 2025-01-28 NOTE — ASSESSMENT
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 Gildardo Hawkins is a Cantonese speaking 68-year-old male with PMHx of HTN, HLD who presents to the clinic for f/u of Stage IV squamous cell carcinoma.  # stage IV squamous cell carcinoma (based on malignant pleural effusion) --Liquid NGS testing showed PTEN R159S and [id number] positive, MSI high not detected --Tumor NGS PDL-1 25%, TMB-high, CDKN2A -- started carboplatin/Abraxane/pembrolizumab on 9/12/24 --Status post 3 cycles of chemoimmunotherapy, CT chest abdomen pelvis performed showing excellent partial response -- cleared for C2 maintenance pembrolizumab today   -- patient is on therapy with pembrolizumab requiring intensive monitoring for toxicity such as RALPH, transaminitis with CBC, CMP & TSH every 6 weeks  # white coat hypertension --on losartan and amlodipine --monitor BP at home, SBP is 140s range   RTC in 6 wks.

## 2025-03-10 NOTE — PHYSICAL EXAM
[Normal] : affect appropriate [de-identified] : Normal work of breathing on room air.  Speaking full sentences normal respiratory rate on room air.  Speaking full sentences without increased work of breathing

## 2025-03-10 NOTE — HISTORY OF PRESENT ILLNESS
[M: ___] : M[unfilled] [AJCC Stage: ____] : AJCC Stage: [unfilled] [de-identified] : The patient is a 68-year-old male with stage IV squamous non-small cell lung cancer (NSCLC) with a PD-L1 score of 25%, following since his diagnosis in July 2024. He initially presented with syncope and was found to have an 8.4 cm hypermetabolic lingular mass with central necrosis, a left pleural effusion, FDG uptake in the left pleura, mediastinal lymphadenopathy, and a mildly thickened left adrenal gland with increased FDG uptake concerning for metastasis. There was also worsening metastatic lymphadenopathy and a small bone lesion in the left 8th rib. MRI brain on July 16, 2024, was negative for metastatic disease.    A left pleural and upper lobe biopsy on July 26, 2024, confirmed metastatic squamous cell carcinoma. Next-generation sequencing (NGS) showed no  mutations, a high tumor mutational burden (TMB), and a PD-L1 score of 25%, with several variants of potential or unknown clinical significance.    He started treatment with carboplatin, nab-paclitaxel (Abraxane), and pembrolizumab on September 12, 2024, completing four cycles. A scan on November 20, 2024, showed a decrease in the size of all previously known pulmonary masses, shrinkage of the adrenal nodule, and an indeterminate small new sclerotic focus in the anterior left 3rd rib. [de-identified] : Lung SCC  [de-identified] : Tier II: Variants of Potential Clinical Significance CDKN2A p.([id number]) TP53 p.([id number]) Tier III: Variants of Unknown Clinical Significance ATR p.([id number]) TMB-high PDL-1 25% [de-identified] : Feels well today. Mild cough persists but he remains fully active, actively practicing [employer]

## 2025-03-10 NOTE — ASSESSMENT
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 He was initially diagnosed in July 2024 and treated with four cycles of carboplatin, nab-paclitaxel, and pembrolizumab. He is now on pembrolizumab maintenance. Prior imaging showed a good treatment response with a decrease in the size of pulmonary masses and adrenal involvement, with a new indeterminate sclerotic lesion in the left 3rd rib. He is currently asymptomatic and due for follow-up imaging.    Plan:   - Continue pembrolizumab maintenance   - Obtain follow-up scans in 1-2 weeks   - Return to clinic to review results   - Cleared for treatment today   The patient is on immunotherapy requiring frequent and intensive monitoring for immune related adverse events. This includes CBC. TSH, CMP, and thorough history and directed examination

## 2025-03-28 NOTE — PHYSICAL EXAM
[Normal] : affect appropriate [de-identified] : Normal work of breathing on room air.  Speaking full sentences normal respiratory rate on room air.  Speaking full sentences without increased work of breathing

## 2025-03-28 NOTE — HISTORY OF PRESENT ILLNESS
[M: ___] : M[unfilled] [AJCC Stage: ____] : AJCC Stage: [unfilled] [de-identified] : The patient is a 68-year-old male with stage IV squamous non-small cell lung cancer (NSCLC) with a PD-L1 score of 25%, following since his diagnosis in July 2024. He initially presented with syncope and was found to have an 8.4 cm hypermetabolic lingular mass with central necrosis, a left pleural effusion, FDG uptake in the left pleura, mediastinal lymphadenopathy, and a mildly thickened left adrenal gland with increased FDG uptake concerning for metastasis. There was also worsening metastatic lymphadenopathy and a small bone lesion in the left 8th rib. MRI brain on July 16, 2024, was negative for metastatic disease.    A left pleural and upper lobe biopsy on July 26, 2024, confirmed metastatic squamous cell carcinoma. Next-generation sequencing (NGS) showed no  mutations, a high tumor mutational burden (TMB), and a PD-L1 score of 25%, with several variants of potential or unknown clinical significance.    He started treatment with carboplatin, nab-paclitaxel (Abraxane), and pembrolizumab on September 12, 2024, completing four cycles. A scan on November 20, 2024, showed a decrease in the size of all previously known pulmonary masses, shrinkage of the adrenal nodule, and an indeterminate small new sclerotic focus in the anterior left 3rd rib. [de-identified] : Lung SCC  [de-identified] : Tier II: Variants of Potential Clinical Significance CDKN2A p.([id number]) TP53 p.([id number]) Tier III: Variants of Unknown Clinical Significance ATR p.([id number]) TMB-high PDL-1 25% [de-identified] : Feels well today. Reports no changes in his clinical condition. Here to discuss scans.   3/22/25: CT CAP- Since November 2024, increased left paramediastinal/upper lobe mass and new pleural metastases. Healed pathologic left eighth rib fracture. Stable few subtle sclerotic foci in left ribs. Stable nodular adrenal thickening bilaterally. Indeterminate subcutaneous nodule in posterior left back, stable size, with new enhancement since July 2024, consider tissue sampling.

## 2025-03-28 NOTE — PHYSICAL EXAM
[Normal] : affect appropriate [de-identified] : Normal work of breathing on room air.  Speaking full sentences normal respiratory rate on room air.  Speaking full sentences without increased work of breathing

## 2025-03-28 NOTE — HISTORY OF PRESENT ILLNESS
[M: ___] : M[unfilled] [AJCC Stage: ____] : AJCC Stage: [unfilled] [de-identified] : The patient is a 68-year-old male with stage IV squamous non-small cell lung cancer (NSCLC) with a PD-L1 score of 25%, following since his diagnosis in July 2024. He initially presented with syncope and was found to have an 8.4 cm hypermetabolic lingular mass with central necrosis, a left pleural effusion, FDG uptake in the left pleura, mediastinal lymphadenopathy, and a mildly thickened left adrenal gland with increased FDG uptake concerning for metastasis. There was also worsening metastatic lymphadenopathy and a small bone lesion in the left 8th rib. MRI brain on July 16, 2024, was negative for metastatic disease.    A left pleural and upper lobe biopsy on July 26, 2024, confirmed metastatic squamous cell carcinoma. Next-generation sequencing (NGS) showed no  mutations, a high tumor mutational burden (TMB), and a PD-L1 score of 25%, with several variants of potential or unknown clinical significance.    He started treatment with carboplatin, nab-paclitaxel (Abraxane), and pembrolizumab on September 12, 2024, completing four cycles. A scan on November 20, 2024, showed a decrease in the size of all previously known pulmonary masses, shrinkage of the adrenal nodule, and an indeterminate small new sclerotic focus in the anterior left 3rd rib. [de-identified] : Lung SCC  [de-identified] : Tier II: Variants of Potential Clinical Significance CDKN2A p.([id number]) TP53 p.([id number]) Tier III: Variants of Unknown Clinical Significance ATR p.([id number]) TMB-high PDL-1 25% [de-identified] : Feels well today. Reports no changes in his clinical condition. Here to discuss scans.   3/22/25: CT CAP- Since November 2024, increased left paramediastinal/upper lobe mass and new pleural metastases. Healed pathologic left eighth rib fracture. Stable few subtle sclerotic foci in left ribs. Stable nodular adrenal thickening bilaterally. Indeterminate subcutaneous nodule in posterior left back, stable size, with new enhancement since July 2024, consider tissue sampling.

## 2025-03-28 NOTE — ASSESSMENT
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 He was initially diagnosed in July 2024 and treated with four cycles of carboplatin, nab-paclitaxel, and pembrolizumab. He is now on pembrolizumab maintenance. Prior imaging showed a good treatment response with a decrease in the size of pulmonary masses and adrenal involvement, with a new indeterminate sclerotic lesion in the left 3rd rib.   Repeat scans from 3/22 concerning for progression in primary lesion and 2 new pleural deposits. Given few sites of POD, RT with continuation of IO may be reasonable here. Will present case at TB Thursday and f/u   Plan:   - Continue pembrolizumab maintenance pending discussion - Present at multidisciplinary tumor board - Return to clinic to review results     The patient is on immunotherapy requiring frequent and intensive monitoring for immune related adverse events. This includes CBC. TSH, CMP, and thorough history and directed examination

## 2025-04-03 NOTE — PHYSICAL EXAM
[Normal] : affect appropriate [de-identified] : Normal work of breathing on room air.  Speaking full sentences normal respiratory rate on room air.  Speaking full sentences without increased work of breathing

## 2025-04-03 NOTE — HISTORY OF PRESENT ILLNESS
[M: ___] : M[unfilled] [AJCC Stage: ____] : AJCC Stage: [unfilled] [de-identified] : Encounter conducted via audio-only telephone system. Obtained verbal consent for patient to conduct visit in this manner. Pt located at home, physician located at [facility]   The patient is a 68-year-old male with stage IV squamous non-small cell lung cancer (NSCLC) with a PD-L1 score of 25%, following since his diagnosis in July 2024. He initially presented with syncope and was found to have an 8.4 cm hypermetabolic lingular mass with central necrosis, a left pleural effusion, FDG uptake in the left pleura, mediastinal lymphadenopathy, and a mildly thickened left adrenal gland with increased FDG uptake concerning for metastasis. There was also worsening metastatic lymphadenopathy and a small bone lesion in the left 8th rib. MRI brain on July 16, 2024, was negative for metastatic disease.    A left pleural and upper lobe biopsy on July 26, 2024, confirmed metastatic squamous cell carcinoma. Next-generation sequencing (NGS) showed no  mutations, a high tumor mutational burden (TMB), and a PD-L1 score of 25%, with several variants of potential or unknown clinical significance.    He started treatment with carboplatin, nab-paclitaxel (Abraxane), and pembrolizumab on September 12, 2024, completing four cycles. A scan on November 20, 2024, showed a decrease in the size of all previously known pulmonary masses, shrinkage of the adrenal nodule, and an indeterminate small new sclerotic focus in the anterior left 3rd rib. [de-identified] : Lung SCC  [de-identified] : Tier II: Variants of Potential Clinical Significance CDKN2A p.([id number]) TP53 p.([id number]) Tier III: Variants of Unknown Clinical Significance ATR p.([id number]) TMB-high PDL-1 25% [de-identified] : 3/22/25: CT CAP- Since November 2024, increased left paramediastinal/upper lobe mass and new pleural metastases. Healed pathologic left eighth rib fracture. Stable few subtle sclerotic foci in left ribs. Stable nodular adrenal thickening bilaterally. Indeterminate subcutaneous nodule in posterior left back, stable size, with new enhancement since July 2024, consider tissue sampling.  Case discussed at multidisciplinary tumor board and recommendations were to treat areas of progression with RT and continue immunotherapy

## 2025-04-03 NOTE — ASSESSMENT
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 He was initially diagnosed in July 2024 and treated with four cycles of carboplatin, nab-paclitaxel, and pembrolizumab. He is now on pembrolizumab maintenance. Prior imaging showed a good treatment response with a decrease in the size of pulmonary masses and adrenal involvement, with a new indeterminate sclerotic lesion in the left 3rd rib.   Repeat scans from 3/22 concerning for progression in primary lesion and 2 new pleural deposits. Given few sites of POD, RT with continuation of IO may be reasonable here. Case discussed at multidisciplinary tumor board and consensus indeed to treat areas of POD with radiation and continue current therapy.   Plan:   - Continue pembrolizumab  - Will have rad onc reach out to patient wife to arrange treatment  - RTC 4/21 for next treatment

## 2025-04-07 NOTE — REASON FOR VISIT
[Consideration of Curative Therapy] : consideration of curative therapy for [Lung Cancer] : lung cancer Yes

## 2025-04-09 NOTE — HISTORY OF PRESENT ILLNESS
[FreeTextEntry1] : Gildardo Hawkins is a 68 year old Cantonese speaking male with a hx of stage IV PTxNxM1 squamous non-small cell lung cancer (NSCLC) with a PD-L1 score of 25%, diagnosed in July 2024 and followed by  and now . On September 12, 2024 he completed 4 cycles of carboplatin, nab-paclitaxel (Abraxane), and pembrolizumab. His Novmeber 20,2024 scan showed a decrease in known pulmonary masses, and shrinkage of his adrenal nodule, and a new sclerotic focus in the anterior left third rib. Case discussed at multidisciplinary tumor board and recommendations were to treat areas of progression with RT and continue immunotherapy (pembro) per Dr. Munoz.   Disease: NSCLC   Pathology: squamous cell carcinoma - PDL-1 25%, CKDN2A   TNM stage: M1 AJCC Stage: IV   4/8/25 NEW CONSULT Cantonese  offered, patient's wife preferred to translate.  Patient states he is feeling well. he denies chest pain, shortness of breath, headaches, dizziness, pain. he states he has an occasional dry cough. reports a rash to his upper chest which is resolving after using a cream prescribed by his PCP.  Prior Chemo: yes Prior RT: no Pacemaker: no  ___________________________________________________________________________________________________  HPI _____  Patient has a PMH of HTN, HLD, and in July of 2024 was hospitalized for syncope and a new diagnosis of empyema with a left lingular mass with central necrosis and pleural effusion with 3 enlarged lymph nodes.   7/8/24 CT scan showed 8.4 cm lingular mass with central necrosis and pleural effusion. He was also found to have a 2.3 cm left lateral pleural mass that abuts the lingular mass. He was also found to have persistent prevascular hypermetabolic chip mass~ 5.8 cm in diameter with central necrosis. This abuts a 2.2 cm aortopulmonary hypermetabolic node which is exerting mass effect on trachea. 1.3 cm R upper paratracheal mildly hypermetabolic node, 1.4 cm precarinal hypermetabolic node, 3.4 cm left hilar hypermetabolic node were also present.  7/26/24: Surgical Pathology Report Left pleural and upper lobe biopsy showed Metastatic squamous cell carcinoma present in pleural tissue, and marked fibrinous pleuritis with severe acute and chronic inflammation, ulceration, and fibrin deposition  - Left pleural fluid analysis was POSITIVE FOR MALIGNANT CELLS. Favor squamous cell carcinoma Tier II: Variants of Potential Clinical Significance CDKN2A p.([id number]) TP53 p.([id number]) Tier III: Variants of Unknown Clinical Significance ATR p.([id number]) TMB-high PDL-1 25%  7/16/24 MRI Brain: No hemorrhage, mass, midline shift or infarction. No abnormal enhancement. Mild white matter disease likely mild chronic microvascular ischemic disease.  7/24/24: PET/CT: Hypermetablic lingular mass with central necrosis, compatible with neoplasm. Persistent moderate left pleural effusion. Areas of increased FDG uptake in the left pleura, suspicious neoplastic involvement; most prominently, 2.3 cm left lateral pleural hypermetabolic mass. Mediastinal (including in the upper right paratracheal chip station) and left hilar hypermetabolic lymphadenopathy, compatible with metastatic neoplasm. The largest of these is perivascular lymph chip mass with necrosis. Mildly thickened left adrenal gland with foci of increased FDG uptake; metastases cannot be excluded. Mildly thickened right adrenal gland, without abnormal FDG uptake. Medialization of the left vocal cord, which may be seen with left vocal cord paralysis. This may be related to the dominant mediastinal mass. Persistent peripheral left lower lobe airspace opacities without significant FDG uptake. This may be due to compressive atelectasis from the aforementioned left pleural effusion.  8/9/24: XR Chest: There is peripheral repositioning of a left-sided PICC the tip located in the midsuperior vena cava. The cardiac mediastinal and hilar contours are unchanged. Again noted is increased opacity over the left lower lung zone with silhouetting of the left heart border. There may be some air bronchograms and a parenchymal pneumonia/atelectasis unchanged from prior imaging cannot be excluded. There is left hemidiaphragm elevation. No pneumothorax.  9/3/24: CT Chest/Abdomen/Pelvis: Marked worsening of pleural-parenchymal malignant process in the left hemithorax with enlargement of the existing masses and appearance of new pleural nodules. Worsening mediastinal lymphadenopathy. Small bone lesion in the left eighth rib with nondisplaced pathologic fracture due to direct pleural or distant metastasis.  11/20/24 CT chest 1.  The previously noted occluded left upper lobe bronchus is now patent. 2.  Decreased size of all of the previously noted pulmonary nodule/masses as specified above. 3.  Indeterminate small new sclerotic focus in the anterior aspect of the left third rib; otherwise, no new sites of disease 4.  Decreased size of previously noted adrenal nodules.  3/22/25 CT chest/abdomen/pelvis FINDINGS: CHEST: LUNGS AND LARGE AIRWAYS: Patent central airways. Left paramediastinal/upper lobe enhancing mass 4.4 x 3.5 cm (previously 2.9 x 2.8 cm) with central necrosis, demonstrating encasement of left upper lobe bronchus and hilar bundle with infiltration of adjacent fat planes without gross invasion of adjacent aortic arch and pulmonary trunk/left main pulmonary artery however there is encasement of left upper lobe pulmonary artery branches similar to prior. Emphysema. Scarring in left upper lobe and lingula with traction bronchiectasis PLEURA: Solid round posterior midthoracic pleural-based nodule 1.6 (10:137) and adjacent smaller nodule 1.0 cm (10:159).  Solid round anterior lateral basilar pleural-based nodule 1.3 cm within air-filled cavity 4.5 cm (previously fluid and air-filled cavity). No pleural effusion. VESSELS: No acute pulmonary thromboembolism, decreased previously seen pulmonary vein clot with minimal chronic post thrombotic changes in this region (10:255). HEART: Heart size is normal. No pericardial effusion. Patent left atrial appendage. MEDIASTINUM AND FRANCESCA: Stable right upper paratracheal lymph node 0.8 x 0.8 cm (10:102). CHEST WALL AND LOWER NECK: Unchanged enhancing nodule in left paramedian back subcutaneous tissues 1.7 cm (3:53), not radiotracer avid on the prior PET/CT stable since July 2024 however appeared nonenhancing on that study. Chronic elevated left diaphragm. Subcentimeter right thyroid nodule. Since November 2024, increased left paramediastinal/upper lobe mass and new pleural metastases. Healed pathologic left eighth rib fracture. Stable few subtle sclerotic foci in left ribs. Stable nodular adrenal thickening bilaterally. Indeterminate subcutaneous nodule in posterior left back, stable size, with new enhancement since July 2024, consider tissue sampling.

## 2025-04-09 NOTE — HISTORY OF PRESENT ILLNESS
[FreeTextEntry1] : Gildardo Hawkins is a 68 year old Cantonese speaking male with a hx of stage IV PTxNxM1 squamous non-small cell lung cancer (NSCLC) with a PD-L1 score of 25%, diagnosed in July 2024 and followed by  and now . On September 12, 2024 he completed 4 cycles of carboplatin, nab-paclitaxel (Abraxane), and pembrolizumab. His Novmeber 20,2024 scan showed a decrease in known pulmonary masses, and shrinkage of his adrenal nodule, and a new sclerotic focus in the anterior left third rib. Case discussed at multidisciplinary tumor board and recommendations were to treat areas of progression with RT and continue immunotherapy (pembro) per Dr. Munoz.   Disease: NSCLC   Pathology: squamous cell carcinoma - PDL-1 25%, CKDN2A   TNM stage: M1 AJCC Stage: IV   4/8/25 NEW CONSULT Cantonese  offered, patient's wife preferred to translate.  Patient states he is feeling well. he denies chest pain, shortness of breath, headaches, dizziness, pain. he states he has an occasional dry cough. reports a rash to his upper chest which is resolving after using a cream prescribed by his PCP.  Prior Chemo: yes Prior RT: no Pacemaker: no  ___________________________________________________________________________________________________  HPI _____  Patient has a PMH of HTN, HLD, and in July of 2024 was hospitalized for syncope and a new diagnosis of empyema with a left lingular mass with central necrosis and pleural effusion with 3 enlarged lymph nodes.   7/8/24 CT scan showed 8.4 cm lingular mass with central necrosis and pleural effusion. He was also found to have a 2.3 cm left lateral pleural mass that abuts the lingular mass. He was also found to have persistent prevascular hypermetabolic chip mass~ 5.8 cm in diameter with central necrosis. This abuts a 2.2 cm aortopulmonary hypermetabolic node which is exerting mass effect on trachea. 1.3 cm R upper paratracheal mildly hypermetabolic node, 1.4 cm precarinal hypermetabolic node, 3.4 cm left hilar hypermetabolic node were also present.  7/26/24: Surgical Pathology Report Left pleural and upper lobe biopsy showed Metastatic squamous cell carcinoma present in pleural tissue, and marked fibrinous pleuritis with severe acute and chronic inflammation, ulceration, and fibrin deposition  - Left pleural fluid analysis was POSITIVE FOR MALIGNANT CELLS. Favor squamous cell carcinoma Tier II: Variants of Potential Clinical Significance CDKN2A p.([id number]) TP53 p.([id number]) Tier III: Variants of Unknown Clinical Significance ATR p.([id number]) TMB-high PDL-1 25%  7/16/24 MRI Brain: No hemorrhage, mass, midline shift or infarction. No abnormal enhancement. Mild white matter disease likely mild chronic microvascular ischemic disease.  7/24/24: PET/CT: Hypermetablic lingular mass with central necrosis, compatible with neoplasm. Persistent moderate left pleural effusion. Areas of increased FDG uptake in the left pleura, suspicious neoplastic involvement; most prominently, 2.3 cm left lateral pleural hypermetabolic mass. Mediastinal (including in the upper right paratracheal chip station) and left hilar hypermetabolic lymphadenopathy, compatible with metastatic neoplasm. The largest of these is perivascular lymph chip mass with necrosis. Mildly thickened left adrenal gland with foci of increased FDG uptake; metastases cannot be excluded. Mildly thickened right adrenal gland, without abnormal FDG uptake. Medialization of the left vocal cord, which may be seen with left vocal cord paralysis. This may be related to the dominant mediastinal mass. Persistent peripheral left lower lobe airspace opacities without significant FDG uptake. This may be due to compressive atelectasis from the aforementioned left pleural effusion.  8/9/24: XR Chest: There is peripheral repositioning of a left-sided PICC the tip located in the midsuperior vena cava. The cardiac mediastinal and hilar contours are unchanged. Again noted is increased opacity over the left lower lung zone with silhouetting of the left heart border. There may be some air bronchograms and a parenchymal pneumonia/atelectasis unchanged from prior imaging cannot be excluded. There is left hemidiaphragm elevation. No pneumothorax.  9/3/24: CT Chest/Abdomen/Pelvis: Marked worsening of pleural-parenchymal malignant process in the left hemithorax with enlargement of the existing masses and appearance of new pleural nodules. Worsening mediastinal lymphadenopathy. Small bone lesion in the left eighth rib with nondisplaced pathologic fracture due to direct pleural or distant metastasis.  11/20/24 CT chest 1.  The previously noted occluded left upper lobe bronchus is now patent. 2.  Decreased size of all of the previously noted pulmonary nodule/masses as specified above. 3.  Indeterminate small new sclerotic focus in the anterior aspect of the left third rib; otherwise, no new sites of disease 4.  Decreased size of previously noted adrenal nodules.  3/22/25 CT chest/abdomen/pelvis FINDINGS: CHEST: LUNGS AND LARGE AIRWAYS: Patent central airways. Left paramediastinal/upper lobe enhancing mass 4.4 x 3.5 cm (previously 2.9 x 2.8 cm) with central necrosis, demonstrating encasement of left upper lobe bronchus and hilar bundle with infiltration of adjacent fat planes without gross invasion of adjacent aortic arch and pulmonary trunk/left main pulmonary artery however there is encasement of left upper lobe pulmonary artery branches similar to prior. Emphysema. Scarring in left upper lobe and lingula with traction bronchiectasis PLEURA: Solid round posterior midthoracic pleural-based nodule 1.6 (10:137) and adjacent smaller nodule 1.0 cm (10:159).  Solid round anterior lateral basilar pleural-based nodule 1.3 cm within air-filled cavity 4.5 cm (previously fluid and air-filled cavity). No pleural effusion. VESSELS: No acute pulmonary thromboembolism, decreased previously seen pulmonary vein clot with minimal chronic post thrombotic changes in this region (10:255). HEART: Heart size is normal. No pericardial effusion. Patent left atrial appendage. MEDIASTINUM AND FRANCESCA: Stable right upper paratracheal lymph node 0.8 x 0.8 cm (10:102). CHEST WALL AND LOWER NECK: Unchanged enhancing nodule in left paramedian back subcutaneous tissues 1.7 cm (3:53), not radiotracer avid on the prior PET/CT stable since July 2024 however appeared nonenhancing on that study. Chronic elevated left diaphragm. Subcentimeter right thyroid nodule. Since November 2024, increased left paramediastinal/upper lobe mass and new pleural metastases. Healed pathologic left eighth rib fracture. Stable few subtle sclerotic foci in left ribs. Stable nodular adrenal thickening bilaterally. Indeterminate subcutaneous nodule in posterior left back, stable size, with new enhancement since July 2024, consider tissue sampling.

## 2025-04-09 NOTE — HISTORY OF PRESENT ILLNESS
[FreeTextEntry1] : Gildardo Hawkins is a 68 year old Cantonese speaking male with a hx of stage IV PTxNxM1 squamous non-small cell lung cancer (NSCLC) with a PD-L1 score of 25%, diagnosed in July 2024 and followed by  and now . On September 12, 2024 he completed 4 cycles of carboplatin, nab-paclitaxel (Abraxane), and pembrolizumab. His Novmeber 20,2024 scan showed a decrease in known pulmonary masses, and shrinkage of his adrenal nodule, and a new sclerotic focus in the anterior left third rib. Case discussed at multidisciplinary tumor board and recommendations were to treat areas of progression with RT and continue immunotherapy (pembro) per Dr. Munoz.   Disease: NSCLC   Pathology: squamous cell carcinoma - PDL-1 25%, CKDN2A   TNM stage: M1 AJCC Stage: IV   4/8/25 NEW CONSULT Cantonese  offered, patient's wife preferred to translate.  Patient states he is feeling well. he denies chest pain, shortness of breath, headaches, dizziness, pain. he states he has an occasional dry cough. reports a rash to his upper chest which is resolving after using a cream prescribed by his PCP.  Prior Chemo: yes Prior RT: no Pacemaker: no  ___________________________________________________________________________________________________  HPI _____  Patient has a PMH of HTN, HLD, and in July of 2024 was hospitalized for syncope and a new diagnosis of empyema with a left lingular mass with central necrosis and pleural effusion with 3 enlarged lymph nodes.   7/8/24 CT scan showed 8.4 cm lingular mass with central necrosis and pleural effusion. He was also found to have a 2.3 cm left lateral pleural mass that abuts the lingular mass. He was also found to have persistent prevascular hypermetabolic chip mass~ 5.8 cm in diameter with central necrosis. This abuts a 2.2 cm aortopulmonary hypermetabolic node which is exerting mass effect on trachea. 1.3 cm R upper paratracheal mildly hypermetabolic node, 1.4 cm precarinal hypermetabolic node, 3.4 cm left hilar hypermetabolic node were also present.  7/26/24: Surgical Pathology Report Left pleural and upper lobe biopsy showed Metastatic squamous cell carcinoma present in pleural tissue, and marked fibrinous pleuritis with severe acute and chronic inflammation, ulceration, and fibrin deposition  - Left pleural fluid analysis was POSITIVE FOR MALIGNANT CELLS. Favor squamous cell carcinoma Tier II: Variants of Potential Clinical Significance CDKN2A p.([id number]) TP53 p.([id number]) Tier III: Variants of Unknown Clinical Significance ATR p.([id number]) TMB-high PDL-1 25%  7/16/24 MRI Brain: No hemorrhage, mass, midline shift or infarction. No abnormal enhancement. Mild white matter disease likely mild chronic microvascular ischemic disease.  7/24/24: PET/CT: Hypermetablic lingular mass with central necrosis, compatible with neoplasm. Persistent moderate left pleural effusion. Areas of increased FDG uptake in the left pleura, suspicious neoplastic involvement; most prominently, 2.3 cm left lateral pleural hypermetabolic mass. Mediastinal (including in the upper right paratracheal chip station) and left hilar hypermetabolic lymphadenopathy, compatible with metastatic neoplasm. The largest of these is perivascular lymph chip mass with necrosis. Mildly thickened left adrenal gland with foci of increased FDG uptake; metastases cannot be excluded. Mildly thickened right adrenal gland, without abnormal FDG uptake. Medialization of the left vocal cord, which may be seen with left vocal cord paralysis. This may be related to the dominant mediastinal mass. Persistent peripheral left lower lobe airspace opacities without significant FDG uptake. This may be due to compressive atelectasis from the aforementioned left pleural effusion.  8/9/24: XR Chest: There is peripheral repositioning of a left-sided PICC the tip located in the midsuperior vena cava. The cardiac mediastinal and hilar contours are unchanged. Again noted is increased opacity over the left lower lung zone with silhouetting of the left heart border. There may be some air bronchograms and a parenchymal pneumonia/atelectasis unchanged from prior imaging cannot be excluded. There is left hemidiaphragm elevation. No pneumothorax.  9/3/24: CT Chest/Abdomen/Pelvis: Marked worsening of pleural-parenchymal malignant process in the left hemithorax with enlargement of the existing masses and appearance of new pleural nodules. Worsening mediastinal lymphadenopathy. Small bone lesion in the left eighth rib with nondisplaced pathologic fracture due to direct pleural or distant metastasis.  11/20/24 CT chest 1.  The previously noted occluded left upper lobe bronchus is now patent. 2.  Decreased size of all of the previously noted pulmonary nodule/masses as specified above. 3.  Indeterminate small new sclerotic focus in the anterior aspect of the left third rib; otherwise, no new sites of disease 4.  Decreased size of previously noted adrenal nodules.  3/22/25 CT chest/abdomen/pelvis FINDINGS: CHEST: LUNGS AND LARGE AIRWAYS: Patent central airways. Left paramediastinal/upper lobe enhancing mass 4.4 x 3.5 cm (previously 2.9 x 2.8 cm) with central necrosis, demonstrating encasement of left upper lobe bronchus and hilar bundle with infiltration of adjacent fat planes without gross invasion of adjacent aortic arch and pulmonary trunk/left main pulmonary artery however there is encasement of left upper lobe pulmonary artery branches similar to prior. Emphysema. Scarring in left upper lobe and lingula with traction bronchiectasis PLEURA: Solid round posterior midthoracic pleural-based nodule 1.6 (10:137) and adjacent smaller nodule 1.0 cm (10:159).  Solid round anterior lateral basilar pleural-based nodule 1.3 cm within air-filled cavity 4.5 cm (previously fluid and air-filled cavity). No pleural effusion. VESSELS: No acute pulmonary thromboembolism, decreased previously seen pulmonary vein clot with minimal chronic post thrombotic changes in this region (10:255). HEART: Heart size is normal. No pericardial effusion. Patent left atrial appendage. MEDIASTINUM AND FRANCESCA: Stable right upper paratracheal lymph node 0.8 x 0.8 cm (10:102). CHEST WALL AND LOWER NECK: Unchanged enhancing nodule in left paramedian back subcutaneous tissues 1.7 cm (3:53), not radiotracer avid on the prior PET/CT stable since July 2024 however appeared nonenhancing on that study. Chronic elevated left diaphragm. Subcentimeter right thyroid nodule. Since November 2024, increased left paramediastinal/upper lobe mass and new pleural metastases. Healed pathologic left eighth rib fracture. Stable few subtle sclerotic foci in left ribs. Stable nodular adrenal thickening bilaterally. Indeterminate subcutaneous nodule in posterior left back, stable size, with new enhancement since July 2024, consider tissue sampling.

## 2025-04-21 NOTE — ASSESSMENT
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 He was initially diagnosed in July 2024 and treated with four cycles of carboplatin, nab-paclitaxel, and pembrolizumab. He is now on pembrolizumab maintenance. Prior imaging showed a good treatment response with a decrease in the size of pulmonary masses and adrenal involvement, with a new indeterminate sclerotic lesion in the left 3rd rib.   Repeat scans from 3/22 concerning for progression in primary lesion and 2 new pleural deposits. Given few sites of POD, RT with continuation of IO may be reasonable here. Case discussed at multidisciplinary tumor board and consensus indeed to treat areas of POD with radiation and continue current therapy. Currently awaiting RT scheduling  Feels well today, no new complaints.  Plan:   - Continue pembrolizumab, cleared for treatment today - Awaiting scheduling from [facility] for RT - RTC 6/2 for next treatment

## 2025-04-21 NOTE — PHYSICAL EXAM
[Normal] : affect appropriate [Fully active, able to carry on all pre-disease performance without restriction] : Status 0 - Fully active, able to carry on all pre-disease performance without restriction [de-identified] : Normal work of breathing on room air.  Speaking full sentences normal respiratory rate on room air.  Speaking full sentences without increased work of breathing

## 2025-04-21 NOTE — HISTORY OF PRESENT ILLNESS
[M: ___] : M[unfilled] [AJCC Stage: ____] : AJCC Stage: [unfilled] [de-identified] : Encounter conducted via audio-only telephone system. Obtained verbal consent for patient to conduct visit in this manner. Pt located at home, physician located at [facility]   The patient is a 68-year-old male with stage IV squamous non-small cell lung cancer (NSCLC) with a PD-L1 score of 25%, following since his diagnosis in July 2024. He initially presented with syncope and was found to have an 8.4 cm hypermetabolic lingular mass with central necrosis, a left pleural effusion, FDG uptake in the left pleura, mediastinal lymphadenopathy, and a mildly thickened left adrenal gland with increased FDG uptake concerning for metastasis. There was also worsening metastatic lymphadenopathy and a small bone lesion in the left 8th rib. MRI brain on July 16, 2024, was negative for metastatic disease.    A left pleural and upper lobe biopsy on July 26, 2024, confirmed metastatic squamous cell carcinoma. Next-generation sequencing (NGS) showed no  mutations, a high tumor mutational burden (TMB), and a PD-L1 score of 25%, with several variants of potential or unknown clinical significance.    He started treatment with carboplatin, nab-paclitaxel (Abraxane), and pembrolizumab on September 12, 2024, completing four cycles. A scan on November 20, 2024, showed a decrease in the size of all previously known pulmonary masses, shrinkage of the adrenal nodule, and an indeterminate small new sclerotic focus in the anterior left 3rd rib. [de-identified] : Lung SCC  [de-identified] : Tier II: Variants of Potential Clinical Significance CDKN2A p.([id number]) TP53 p.([id number]) Tier III: Variants of Unknown Clinical Significance ATR p.([id number]) TMB-high PDL-1 25% [de-identified] : 3/22/25: CT CAP- Since November 2024, increased left paramediastinal/upper lobe mass and new pleural metastases. Healed pathologic left eighth rib fracture. Stable few subtle sclerotic foci in left ribs. Stable nodular adrenal thickening bilaterally. Indeterminate subcutaneous nodule in posterior left back, stable size, with new enhancement since July 2024, consider tissue sampling.  Case discussed at multidisciplinary tumor board and recommendations were to treat areas of progression with RT and continue immunotherapy  4/11/25: PET- 1. 6.6 cm hypermetabolic aortopulmonary mass, SUV max 22.0, consistent with known malignancy/metastatic disease. 4.6 x 2.6 cm cavitary lesion in the inferior lingula with a hypermetabolic nodular component, consistent with known malignancy. New FDG avid consolidation between this cavitary mass and the mediastinal (aortopulmonary) mass, probably representing malignancy. 2. Hypermetabolic smaller mediastinal nodes, consistent with metastatic disease. 3. Slightly asymmetrically increased uptake in the left adrenal gland, probably representing known adrenal metastasis.

## 2025-05-06 NOTE — HISTORY OF PRESENT ILLNESS
[FreeTextEntry1] :     Gildardo Hawkins is a 68 year old Cantonese speaking male with a hx of stage IV PTxNxM1 squamous non-small cell lung cancer (NSCLC) with a PD-L1 score of 25%, diagnosed in July 2024 and followed by  and now . On September 12, 2024 he completed 4 cycles of carboplatin, nab-paclitaxel (Abraxane), and pembrolizumab. His Novmeber 20,2024 scan showed a decrease in known pulmonary masses, and shrinkage of his adrenal nodule, and a new sclerotic focus in the anterior left third rib. Case discussed at multidisciplinary tumor board and recommendations were to treat areas of progression with RT and continue immunotherapy (pembro) per Dr. Munoz.  Disease: NSCLC Pathology: squamous cell carcinoma - PDL-1 25%, CKDN2A TNM stage: M1 AJCC Stage: IV  5/6/25 Patient has completed 4/15 fx and 1200/4500 cGy of RT to the left lung.  He reported blood in sputum yesterday that subsided. Pictures reviewed and blood scant in nature. Patient denies shortness of breath or chest pain. He does report coughing of copious amount of clear sputum.  Mucinex OTC recommended for symptom relief. To continue RT.   4/8/25 NEW CONSULT [place]  offered, patient's wife preferred to translate. Patient states he is feeling well. he denies chest pain, shortness of breath, headaches, dizziness, pain. he states he has an occasional dry cough. reports a rash to his upper chest which is resolving after using a cream prescribed by his PCP. Prior Chemo: yes Prior RT: no Pacemaker: no  ___________________________________________________________________________________________________  HPI _____  Patient has a PMH of HTN, HLD, and in July of 2024 was hospitalized for syncope and a new diagnosis of empyema with a left lingular mass with central necrosis and pleural effusion with 3 enlarged lymph nodes.  7/8/24 CT scan showed 8.4 cm lingular mass with central necrosis and pleural effusion. He was also found to have a 2.3 cm left lateral pleural mass that abuts the lingular mass. He was also found to have persistent prevascular hypermetabolic chip mass~ 5.8 cm in diameter with central necrosis. This abuts a 2.2 cm aortopulmonary hypermetabolic node which is exerting mass effect on trachea. 1.3 cm R upper paratracheal mildly hypermetabolic node, 1.4 cm precarinal hypermetabolic node, 3.4 cm left hilar hypermetabolic node were also present.  7/26/24: Surgical Pathology Report Left pleural and upper lobe biopsy showed Metastatic squamous cell carcinoma present in pleural tissue, and marked fibrinous pleuritis with severe acute and chronic inflammation, ulceration, and fibrin deposition  - Left pleural fluid analysis was POSITIVE FOR MALIGNANT CELLS. Favor squamous cell carcinoma Tier II: Variants of Potential Clinical Significance CDKN2A p.([id number]) TP53 p.([id number]) Tier III: Variants of Unknown Clinical Significance ATR p.([id number]) TMB-high PDL-1 25%  7/16/24 MRI Brain: No hemorrhage, mass, midline shift or infarction. No abnormal enhancement. Mild white matter disease likely mild chronic microvascular ischemic disease.  7/24/24: PET/CT: Hypermetablic lingular mass with central necrosis, compatible with neoplasm. Persistent moderate left pleural effusion. Areas of increased FDG uptake in the left pleura, suspicious neoplastic involvement; most prominently, 2.3 cm left lateral pleural hypermetabolic mass. Mediastinal (including in the upper right paratracheal chip station) and left hilar hypermetabolic lymphadenopathy, compatible with metastatic neoplasm. The largest of these is perivascular lymph chip mass with necrosis. Mildly thickened left adrenal gland with foci of increased FDG uptake; metastases cannot be excluded. Mildly thickened right adrenal gland, without abnormal FDG uptake. Medialization of the left vocal cord, which may be seen with left vocal cord paralysis. This may be related to the dominant mediastinal mass. Persistent peripheral left lower lobe airspace opacities without significant FDG uptake. This may be due to compressive atelectasis from the aforementioned left pleural effusion.  8/9/24: XR Chest: There is peripheral repositioning of a left-sided PICC the tip located in the midsuperior vena cava. The cardiac mediastinal and hilar contours are unchanged. Again noted is increased opacity over the left lower lung zone with silhouetting of the left heart border. There may be some air bronchograms and a parenchymal pneumonia/atelectasis unchanged from prior imaging cannot be excluded. There is left hemidiaphragm elevation. No pneumothorax.  9/3/24: CT Chest/Abdomen/Pelvis: Marked worsening of pleural-parenchymal malignant process in the left hemithorax with enlargement of the existing masses and appearance of new pleural nodules. Worsening mediastinal lymphadenopathy. Small bone lesion in the left eighth rib with nondisplaced pathologic fracture due to direct pleural or distant metastasis.  11/20/24 CT chest 1. The previously noted occluded left upper lobe bronchus is now patent. 2. Decreased size of all of the previously noted pulmonary nodule/masses as specified above. 3. Indeterminate small new sclerotic focus in the anterior aspect of the left third rib; otherwise, no new sites of disease 4. Decreased size of previously noted adrenal nodules.  3/22/25 CT chest/abdomen/pelvis FINDINGS: CHEST: LUNGS AND LARGE AIRWAYS: Patent central airways. Left paramediastinal/upper lobe enhancing mass 4.4 x 3.5 cm (previously 2.9 x 2.8 cm) with central necrosis, demonstrating encasement of left upper lobe bronchus and hilar bundle with infiltration of adjacent fat planes without gross invasion of adjacent aortic arch and pulmonary trunk/left main pulmonary artery however there is encasement of left upper lobe pulmonary artery branches similar to prior. Emphysema. Scarring in left upper lobe and lingula with traction bronchiectasis PLEURA: Solid round posterior midthoracic pleural-based nodule 1.6 (10:137) and adjacent smaller nodule 1.0 cm (10:159). Solid round anterior lateral basilar pleural-based nodule 1.3 cm within air-filled cavity 4.5 cm (previously fluid and air-filled cavity). No pleural effusion. VESSELS: No acute pulmonary thromboembolism, decreased previously seen pulmonary vein clot with minimal chronic post thrombotic changes in this region (10:255). HEART: Heart size is normal. No pericardial effusion. Patent left atrial appendage. MEDIASTINUM AND FRANCESCA: Stable right upper paratracheal lymph node 0.8 x 0.8 cm (10:102). CHEST WALL AND LOWER NECK: Unchanged enhancing nodule in left paramedian back subcutaneous tissues 1.7 cm (3:53), not radiotracer avid on the prior PET/CT stable since July 2024 however appeared nonenhancing on that study. Chronic elevated left diaphragm. Subcentimeter right thyroid nodule. Since November 2024, increased left paramediastinal/upper lobe mass and new pleural metastases. Healed pathologic left eighth rib fracture. Stable few subtle sclerotic foci in left ribs. Stable nodular adrenal thickening bilaterally. Indeterminate subcutaneous nodule in posterior left back, stable size, with new enhancement since July 2024, consider tissue sampling.

## 2025-05-06 NOTE — DISEASE MANAGEMENT
[TTNM] : x [NTNM] : x [MTNM] : 1 [de-identified] : 1200 [[place] [id number]] : 6136 [de-identified] : left lung

## 2025-05-13 NOTE — DISEASE MANAGEMENT
[Pathological] : TNM Stage: p [IV] : IV [TTNM] : x [NTNM] : x [MTNM] : 1 [de-identified] : 2356 [[place] [id number]] : 1044 [de-identified] : left lung

## 2025-05-13 NOTE — DISEASE MANAGEMENT
[Pathological] : TNM Stage: p [IV] : IV [TTNM] : x [NTNM] : x [MTNM] : 1 [de-identified] : 3907 [[place] [id number]] : 6531 [de-identified] : left lung

## 2025-05-13 NOTE — HISTORY OF PRESENT ILLNESS
[FreeTextEntry1] : Gildardo Hawkins is a 68 year old Cantonese speaking male with a hx of stage IV PTxNxM1 squamous non-small cell lung cancer (NSCLC) with a PD-L1 score of 25%, diagnosed in July 2024 and followed by  and now . On September 12, 2024 he completed 4 cycles of carboplatin, nab-paclitaxel (Abraxane), and pembrolizumab. His Novmeber 20,2024 scan showed a decrease in known pulmonary masses, and shrinkage of his adrenal nodule, and a new sclerotic focus in the anterior left third rib. Case discussed at multidisciplinary tumor board and recommendations were to treat areas of progression with RT and continue immunotherapy (pembro) per Dr. Munoz.  Disease: NSCLC Pathology: squamous cell carcinoma - PDL-1 25%, CKDN2A TNM stage: M1 AJCC Stage: IV  5/13/25 Patient has completed 9/15 fx and 2700/4500 cGy of RT to the left lung.  His congestion has subsided with the use of Mucinex. He reports watery itchy eyes and plans to start an antihistamine soon for allergies.  He denies shortness of breath or hemoptysis. To continue RT.   5/6/25 Patient has completed 4/15 fx and 1200/4500 cGy of RT to the left lung. He reported blood in sputum yesterday that subsided. Pictures reviewed and blood scant in nature. Patient denies shortness of breath or chest pain. He does report coughing of copious amount of clear sputum. Mucinex OTC recommended for symptom relief. To continue RT.  4[facility]8/25 [facility], patient's wife preferred to translate. Patient states he is feeling well. he denies chest pain, shortness of breath, headaches, dizziness, pain. he states he has an occasional dry cough. reports a rash to his upper chest which is resolving after using a cream prescribed by his PCP. Prior Chemo: yes Prior RT: no Pacemaker: no  ___________________________________________________________________________________________________  HPI _____  Patient has a PMH of HTN, HLD, and in July of 2024 was hospitalized for syncope and a new diagnosis of empyema with a left lingular mass with central necrosis and pleural effusion with 3 enlarged lymph nodes.  7/8/24 CT scan showed 8.4 cm lingular mass with central necrosis and pleural effusion. He was also found to have a 2.3 cm left lateral pleural mass that abuts the lingular mass. He was also found to have persistent prevascular hypermetabolic chip mass~ 5.8 cm in diameter with central necrosis. This abuts a 2.2 cm aortopulmonary hypermetabolic node which is exerting mass effect on trachea. 1.3 cm R upper paratracheal mildly hypermetabolic node, 1.4 cm precarinal hypermetabolic node, 3.4 cm left hilar hypermetabolic node were also present.  7/26/24: Surgical Pathology Report Left pleural and upper lobe biopsy showed Metastatic squamous cell carcinoma present in pleural tissue, and marked fibrinous pleuritis with severe acute and chronic inflammation, ulceration, and fibrin deposition  - Left pleural fluid analysis was POSITIVE FOR MALIGNANT CELLS. Favor squamous cell carcinoma Tier II: Variants of Potential Clinical Significance CDKN2A p.([id number]) TP53 p.([id number]) Tier III: Variants of Unknown Clinical Significance ATR p.([id number]) TMB-high PDL-1 25%  7/16/24 MRI Brain: No hemorrhage, mass, midline shift or infarction. No abnormal enhancement. Mild white matter disease likely mild chronic microvascular ischemic disease.  7/24/24: PET/CT: Hypermetablic lingular mass with central necrosis, compatible with neoplasm. Persistent moderate left pleural effusion. Areas of increased FDG uptake in the left pleura, suspicious neoplastic involvement; most prominently, 2.3 cm left lateral pleural hypermetabolic mass. Mediastinal (including in the upper right paratracheal chip station) and left hilar hypermetabolic lymphadenopathy, compatible with metastatic neoplasm. The largest of these is perivascular lymph chip mass with necrosis. Mildly thickened left adrenal gland with foci of increased FDG uptake; metastases cannot be excluded. Mildly thickened right adrenal gland, without abnormal FDG uptake. Medialization of the left vocal cord, which may be seen with left vocal cord paralysis. This may be related to the dominant mediastinal mass. Persistent peripheral left lower lobe airspace opacities without significant FDG uptake. This may be due to compressive atelectasis from the aforementioned left pleural effusion.  8/9/24: XR Chest: There is peripheral repositioning of a left-sided PICC the tip located in the midsuperior vena cava. The cardiac mediastinal and hilar contours are unchanged. Again noted is increased opacity over the left lower lung zone with silhouetting of the left heart border. There may be some air bronchograms and a parenchymal pneumonia/atelectasis unchanged from prior imaging cannot be excluded. There is left hemidiaphragm elevation. No pneumothorax.  9/3/24: CT Chest/Abdomen/Pelvis: Marked worsening of pleural-parenchymal malignant process in the left hemithorax with enlargement of the existing masses and appearance of new pleural nodules. Worsening mediastinal lymphadenopathy. Small bone lesion in the left eighth rib with nondisplaced pathologic fracture due to direct pleural or distant metastasis.  11/20/24 CT chest 1. The previously noted occluded left upper lobe bronchus is now patent. 2. Decreased size of all of the previously noted pulmonary nodule/masses as specified above. 3. Indeterminate small new sclerotic focus in the anterior aspect of the left third rib; otherwise, no new sites of disease 4. Decreased size of previously noted adrenal nodules.  3/22/25 CT chest/abdomen/pelvis FINDINGS: CHEST: LUNGS AND LARGE AIRWAYS: Patent central airways. Left paramediastinal/upper lobe enhancing mass 4.4 x 3.5 cm (previously 2.9 x 2.8 cm) with central necrosis, demonstrating encasement of left upper lobe bronchus and hilar bundle with infiltration of adjacent fat planes without gross invasion of adjacent aortic arch and pulmonary trunk/left main pulmonary artery however there is encasement of left upper lobe pulmonary artery branches similar to prior. Emphysema. Scarring in left upper lobe and lingula with traction bronchiectasis PLEURA: Solid round posterior midthoracic pleural-based nodule 1.6 (10:137) and adjacent smaller nodule 1.0 cm (10:159). Solid round anterior lateral basilar pleural-based nodule 1.3 cm within air-filled cavity 4.5 cm (previously fluid and air-filled cavity). No pleural effusion. VESSELS: No acute pulmonary thromboembolism, decreased previously seen pulmonary vein clot with minimal chronic post thrombotic changes in this region (10:255). HEART: Heart size is normal. No pericardial effusion. Patent left atrial appendage. MEDIASTINUM AND FRANCESCA: Stable right upper paratracheal lymph node 0.8 x 0.8 cm (10:102). CHEST WALL AND LOWER NECK: Unchanged enhancing nodule in left paramedian back subcutaneous tissues 1.7 cm (3:53), not radiotracer avid on the prior PET/CT stable since July 2024 however appeared nonenhancing on that study. Chronic elevated left diaphragm. Subcentimeter right thyroid nodule. Since November 2024, increased left paramediastinal/upper lobe mass and new pleural metastases. Healed pathologic left eighth rib fracture. Stable few subtle sclerotic foci in left ribs. Stable nodular adrenal thickening bilaterally. Indeterminate subcutaneous nodule in posterior left back, stable size, with new enhancement since July 2024, consider tissue sampling.

## 2025-05-13 NOTE — PHYSICAL EXAM
[Normal] : no respiratory distress, lungs were clear to auscultation bilaterally [] : no respiratory distress [Respiration, Rhythm And Depth] : normal respiratory rhythm and effort [Exaggerated Use Of Accessory Muscles For Inspiration] : no accessory muscle use

## 2025-05-13 NOTE — HISTORY OF PRESENT ILLNESS
[FreeTextEntry1] : Gildardo Hawkins is a 68 year old Cantonese speaking male with a hx of stage IV PTxNxM1 squamous non-small cell lung cancer (NSCLC) with a PD-L1 score of 25%, diagnosed in July 2024 and followed by  and now . On September 12, 2024 he completed 4 cycles of carboplatin, nab-paclitaxel (Abraxane), and pembrolizumab. His Novmeber 20,2024 scan showed a decrease in known pulmonary masses, and shrinkage of his adrenal nodule, and a new sclerotic focus in the anterior left third rib. Case discussed at multidisciplinary tumor board and recommendations were to treat areas of progression with RT and continue immunotherapy (pembro) per Dr. Mnuoz.  Disease: NSCLC Pathology: squamous cell carcinoma - PDL-1 25%, CKDN2A TNM stage: M1 AJCC Stage: IV  5/13/25 Patient has completed 9/15 fx and 2700/4500 cGy of RT to the left lung.  His congestion has subsided with the use of Mucinex. He reports watery itchy eyes and plans to start an antihistamine soon for allergies.  He denies shortness of breath or hemoptysis. To continue RT.   5/6/25 Patient has completed 4/15 fx and 1200/4500 cGy of RT to the left lung. He reported blood in sputum yesterday that subsided. Pictures reviewed and blood scant in nature. Patient denies shortness of breath or chest pain. He does report coughing of copious amount of clear sputum. Mucinex OTC recommended for symptom relief. To continue RT.  4/8/25 NEW CONSULT [facility]  offered, patient's wife preferred to translate. Patient states he is feeling well. he denies chest pain, shortness of breath, headaches, dizziness, pain. he states he has an occasional dry cough. reports a rash to his upper chest which is resolving after using a cream prescribed by his PCP. Prior Chemo: yes Prior RT: no Pacemaker: no  ___________________________________________________________________________________________________  HPI _____  Patient has a PMH of HTN, HLD, and in July of 2024 was hospitalized for syncope and a new diagnosis of empyema with a left lingular mass with central necrosis and pleural effusion with 3 enlarged lymph nodes.  7/8/24 CT scan showed 8.4 cm lingular mass with central necrosis and pleural effusion. He was also found to have a 2.3 cm left lateral pleural mass that abuts the lingular mass. He was also found to have persistent prevascular hypermetabolic chip mass~ 5.8 cm in diameter with central necrosis. This abuts a 2.2 cm aortopulmonary hypermetabolic node which is exerting mass effect on trachea. 1.3 cm R upper paratracheal mildly hypermetabolic node, 1.4 cm precarinal hypermetabolic node, 3.4 cm left hilar hypermetabolic node were also present.  7/26/24: Surgical Pathology Report Left pleural and upper lobe biopsy showed Metastatic squamous cell carcinoma present in pleural tissue, and marked fibrinous pleuritis with severe acute and chronic inflammation, ulceration, and fibrin deposition  - Left pleural fluid analysis was POSITIVE FOR MALIGNANT CELLS. Favor squamous cell carcinoma Tier II: Variants of Potential Clinical Significance CDKN2A p.([id number]) TP53 p.([id number]) Tier III: Variants of Unknown Clinical Significance ATR p.([id number]) TMB-high PDL-1 25%  7/16/24 MRI Brain: No hemorrhage, mass, midline shift or infarction. No abnormal enhancement. Mild white matter disease likely mild chronic microvascular ischemic disease.  7/24/24: PET/CT: Hypermetablic lingular mass with central necrosis, compatible with neoplasm. Persistent moderate left pleural effusion. Areas of increased FDG uptake in the left pleura, suspicious neoplastic involvement; most prominently, 2.3 cm left lateral pleural hypermetabolic mass. Mediastinal (including in the upper right paratracheal chip station) and left hilar hypermetabolic lymphadenopathy, compatible with metastatic neoplasm. The largest of these is perivascular lymph chip mass with necrosis. Mildly thickened left adrenal gland with foci of increased FDG uptake; metastases cannot be excluded. Mildly thickened right adrenal gland, without abnormal FDG uptake. Medialization of the left vocal cord, which may be seen with left vocal cord paralysis. This may be related to the dominant mediastinal mass. Persistent peripheral left lower lobe airspace opacities without significant FDG uptake. This may be due to compressive atelectasis from the aforementioned left pleural effusion.  8/9/24: XR Chest: There is peripheral repositioning of a left-sided PICC the tip located in the midsuperior vena cava. The cardiac mediastinal and hilar contours are unchanged. Again noted is increased opacity over the left lower lung zone with silhouetting of the left heart border. There may be some air bronchograms and a parenchymal pneumonia/atelectasis unchanged from prior imaging cannot be excluded. There is left hemidiaphragm elevation. No pneumothorax.  9/3/24: CT Chest/Abdomen/Pelvis: Marked worsening of pleural-parenchymal malignant process in the left hemithorax with enlargement of the existing masses and appearance of new pleural nodules. Worsening mediastinal lymphadenopathy. Small bone lesion in the left eighth rib with nondisplaced pathologic fracture due to direct pleural or distant metastasis.  11/20/24 CT chest 1. The previously noted occluded left upper lobe bronchus is now patent. 2. Decreased size of all of the previously noted pulmonary nodule/masses as specified above. 3. Indeterminate small new sclerotic focus in the anterior aspect of the left third rib; otherwise, no new sites of disease 4. Decreased size of previously noted adrenal nodules.  3/22/25 CT chest/abdomen/pelvis FINDINGS: CHEST: LUNGS AND LARGE AIRWAYS: Patent central airways. Left paramediastinal/upper lobe enhancing mass 4.4 x 3.5 cm (previously 2.9 x 2.8 cm) with central necrosis, demonstrating encasement of left upper lobe bronchus and hilar bundle with infiltration of adjacent fat planes without gross invasion of adjacent aortic arch and pulmonary trunk/left main pulmonary artery however there is encasement of left upper lobe pulmonary artery branches similar to prior. Emphysema. Scarring in left upper lobe and lingula with traction bronchiectasis PLEURA: Solid round posterior midthoracic pleural-based nodule 1.6 (10:137) and adjacent smaller nodule 1.0 cm (10:159). Solid round anterior lateral basilar pleural-based nodule 1.3 cm within air-filled cavity 4.5 cm (previously fluid and air-filled cavity). No pleural effusion. VESSELS: No acute pulmonary thromboembolism, decreased previously seen pulmonary vein clot with minimal chronic post thrombotic changes in this region (10:255). HEART: Heart size is normal. No pericardial effusion. Patent left atrial appendage. MEDIASTINUM AND FRANCESCA: Stable right upper paratracheal lymph node 0.8 x 0.8 cm (10:102). CHEST WALL AND LOWER NECK: Unchanged enhancing nodule in left paramedian back subcutaneous tissues 1.7 cm (3:53), not radiotracer avid on the prior PET/CT stable since July 2024 however appeared nonenhancing on that study. Chronic elevated left diaphragm. Subcentimeter right thyroid nodule. Since November 2024, increased left paramediastinal/upper lobe mass and new pleural metastases. Healed pathologic left eighth rib fracture. Stable few subtle sclerotic foci in left ribs. Stable nodular adrenal thickening bilaterally. Indeterminate subcutaneous nodule in posterior left back, stable size, with new enhancement since July 2024, consider tissue sampling.

## 2025-05-20 NOTE — HISTORY OF PRESENT ILLNESS
[FreeTextEntry1] : Gildardo Hawkins is a 68 year old Cantonese speaking male with a hx of stage IV PTxNxM1 squamous non-small cell lung cancer (NSCLC) with a PD-L1 score of 25%, diagnosed in July 2024 and followed by  and now . On September 12, 2024 he completed 4 cycles of carboplatin, nab-paclitaxel (Abraxane), and pembrolizumab. His Novmeber 20,2024 scan showed a decrease in known pulmonary masses, and shrinkage of his adrenal nodule, and a new sclerotic focus in the anterior left third rib. Case discussed at multidisciplinary tumor board and recommendations were to treat areas of progression with RT and continue immunotherapy (pembro) per Dr. Munoz.  Disease: NSCLC Pathology: squamous cell carcinoma - PDL-1 25%, CKDN2A TNM stage: M1 AJCC Stage: IV  5/13/25 Patient has completed 14/15 fx and 4200/4500 cGy of RT to the left lung. PTE packet given with July follow up.  Patient denies any complaints. No shortness of breath or hemoptysis, and mucous production has subsided.   5/13/25 Patient has completed 9/15 fx and 2700/4500 cGy of RT to the left lung. His congestion has subsided with the use of Mucinex. He reports watery itchy eyes and plans to start an antihistamine soon for allergies. He denies shortness of breath or hemoptysis. To continue RT.  5/6/25 Patient has completed 4/15 fx and 1200/4500 cGy of RT to the left lung. He reported blood in sputum yesterday that subsided. Pictures reviewed and blood scant in nature. Patient denies shortness of breath or chest pain. He does report coughing of copious amount of clear sputum. Mucinex OTC recommended for symptom relief. To continue RT.  [facility], patient's wife preferred to translate. Patient states he is feeling well. he denies chest pain, shortness of breath, headaches, dizziness, pain. he states he has an occasional dry cough. reports a rash to his upper chest which is resolving after using a cream prescribed by his PCP. Prior Chemo: yes Prior RT: no Pacemaker: no  ___________________________________________________________________________________________________  HPI _____  Patient has a PMH of HTN, HLD, and in July of 2024 was hospitalized for syncope and a new diagnosis of empyema with a left lingular mass with central necrosis and pleural effusion with 3 enlarged lymph nodes.  7/8/24 CT scan showed 8.4 cm lingular mass with central necrosis and pleural effusion. He was also found to have a 2.3 cm left lateral pleural mass that abuts the lingular mass. He was also found to have persistent prevascular hypermetabolic chip mass~ 5.8 cm in diameter with central necrosis. This abuts a 2.2 cm aortopulmonary hypermetabolic node which is exerting mass effect on trachea. 1.3 cm R upper paratracheal mildly hypermetabolic node, 1.4 cm precarinal hypermetabolic node, 3.4 cm left hilar hypermetabolic node were also present.  7/26/24: Surgical Pathology Report Left pleural and upper lobe biopsy showed Metastatic squamous cell carcinoma present in pleural tissue, and marked fibrinous pleuritis with severe acute and chronic inflammation, ulceration, and fibrin deposition  - Left pleural fluid analysis was POSITIVE FOR MALIGNANT CELLS. Favor squamous cell carcinoma Tier II: Variants of Potential Clinical Significance CDKN2A p.([id number]) TP53 p.([id number]) Tier III: Variants of Unknown Clinical Significance ATR p.([id number]) TMB-high PDL-1 25%  7/16/24 MRI Brain: No hemorrhage, mass, midline shift or infarction. No abnormal enhancement. Mild white matter disease likely mild chronic microvascular ischemic disease.  7/24/24: PET/CT: Hypermetablic lingular mass with central necrosis, compatible with neoplasm. Persistent moderate left pleural effusion. Areas of increased FDG uptake in the left pleura, suspicious neoplastic involvement; most prominently, 2.3 cm left lateral pleural hypermetabolic mass. Mediastinal (including in the upper right paratracheal chip station) and left hilar hypermetabolic lymphadenopathy, compatible with metastatic neoplasm. The largest of these is perivascular lymph chip mass with necrosis. Mildly thickened left adrenal gland with foci of increased FDG uptake; metastases cannot be excluded. Mildly thickened right adrenal gland, without abnormal FDG uptake. Medialization of the left vocal cord, which may be seen with left vocal cord paralysis. This may be related to the dominant mediastinal mass. Persistent peripheral left lower lobe airspace opacities without significant FDG uptake. This may be due to compressive atelectasis from the aforementioned left pleural effusion.  8/9/24: XR Chest: There is peripheral repositioning of a left-sided PICC the tip located in the midsuperior vena cava. The cardiac mediastinal and hilar contours are unchanged. Again noted is increased opacity over the left lower lung zone with silhouetting of the left heart border. There may be some air bronchograms and a parenchymal pneumonia/atelectasis unchanged from prior imaging cannot be excluded. There is left hemidiaphragm elevation. No pneumothorax.  9/3/24: CT Chest/Abdomen/Pelvis: Marked worsening of pleural-parenchymal malignant process in the left hemithorax with enlargement of the existing masses and appearance of new pleural nodules. Worsening mediastinal lymphadenopathy. Small bone lesion in the left eighth rib with nondisplaced pathologic fracture due to direct pleural or distant metastasis.  11/20/24 CT chest 1. The previously noted occluded left upper lobe bronchus is now patent. 2. Decreased size of all of the previously noted pulmonary nodule/masses as specified above. 3. Indeterminate small new sclerotic focus in the anterior aspect of the left third rib; otherwise, no new sites of disease 4. Decreased size of previously noted adrenal nodules.  3/22/25 CT chest/abdomen/pelvis FINDINGS: CHEST: LUNGS AND LARGE AIRWAYS: Patent central airways. Left paramediastinal/upper lobe enhancing mass 4.4 x 3.5 cm (previously 2.9 x 2.8 cm) with central necrosis, demonstrating encasement of left upper lobe bronchus and hilar bundle with infiltration of adjacent fat planes without gross invasion of adjacent aortic arch and pulmonary trunk/left main pulmonary artery however there is encasement of left upper lobe pulmonary artery branches similar to prior. Emphysema. Scarring in left upper lobe and lingula with traction bronchiectasis PLEURA: Solid round posterior midthoracic pleural-based nodule 1.6 (10:137) and adjacent smaller nodule 1.0 cm (10:159). Solid round anterior lateral basilar pleural-based nodule 1.3 cm within air-filled cavity 4.5 cm (previously fluid and air-filled cavity). No pleural effusion. VESSELS: No acute pulmonary thromboembolism, decreased previously seen pulmonary vein clot with minimal chronic post thrombotic changes in this region (10:255). HEART: Heart size is normal. No pericardial effusion. Patent left atrial appendage. MEDIASTINUM AND FRANCESCA: Stable right upper paratracheal lymph node 0.8 x 0.8 cm (10:102). CHEST WALL AND LOWER NECK: Unchanged enhancing nodule in left paramedian back subcutaneous tissues 1.7 cm (3:53), not radiotracer avid on the prior PET/CT stable since July 2024 however appeared nonenhancing on that study. Chronic elevated left diaphragm. Subcentimeter right thyroid nodule. Since November 2024, increased left paramediastinal/upper lobe mass and new pleural metastases. Healed pathologic left eighth rib fracture. Stable few subtle sclerotic foci in left ribs. Stable nodular adrenal thickening bilaterally. Indeterminate subcutaneous nodule in posterior left back, stable size, with new enhancement since July 2024, consider tissue sampling.

## 2025-05-20 NOTE — HISTORY OF PRESENT ILLNESS
[FreeTextEntry1] : Gildardo Hawkins is a 68 year old Cantonese speaking male with a hx of stage IV PTxNxM1 squamous non-small cell lung cancer (NSCLC) with a PD-L1 score of 25%, diagnosed in July 2024 and followed by  and now . On September 12, 2024 he completed 4 cycles of carboplatin, nab-paclitaxel (Abraxane), and pembrolizumab. His Novmeber 20,2024 scan showed a decrease in known pulmonary masses, and shrinkage of his adrenal nodule, and a new sclerotic focus in the anterior left third rib. Case discussed at multidisciplinary tumor board and recommendations were to treat areas of progression with RT and continue immunotherapy (pembro) per Dr. Munoz.  Disease: NSCLC Pathology: squamous cell carcinoma - PDL-1 25%, CKDN2A TNM stage: M1 AJCC Stage: IV  5/13/25 Patient has completed 14/15 fx and 4200/4500 cGy of RT to the left lung. PTE packet given with July follow up.  Patient denies any complaints. No shortness of breath or hemoptysis, and mucous production has subsided.   5/13/25 Patient has completed 9/15 fx and 2700/4500 cGy of RT to the left lung. His congestion has subsided with the use of Mucinex. He reports watery itchy eyes and plans to start an antihistamine soon for allergies. He denies shortness of breath or hemoptysis. To continue RT.  5/6/25 Patient has completed 4/15 fx and 1200/4500 cGy of RT to the left lung. He reported blood in sputum yesterday that subsided. Pictures reviewed and blood scant in nature. Patient denies shortness of breath or chest pain. He does report coughing of copious amount of clear sputum. Mucinex OTC recommended for symptom relief. To continue RT.  4[facility], patient's wife preferred to translate. Patient states he is feeling well. he denies chest pain, shortness of breath, headaches, dizziness, pain. he states he has an occasional dry cough. reports a rash to his upper chest which is resolving after using a cream prescribed by his PCP. Prior Chemo: yes Prior RT: no Pacemaker: no  ___________________________________________________________________________________________________  HPI _____  Patient has a PMH of HTN, HLD, and in July of 2024 was hospitalized for syncope and a new diagnosis of empyema with a left lingular mass with central necrosis and pleural effusion with 3 enlarged lymph nodes.  7/8/24 CT scan showed 8.4 cm lingular mass with central necrosis and pleural effusion. He was also found to have a 2.3 cm left lateral pleural mass that abuts the lingular mass. He was also found to have persistent prevascular hypermetabolic chip mass~ 5.8 cm in diameter with central necrosis. This abuts a 2.2 cm aortopulmonary hypermetabolic node which is exerting mass effect on trachea. 1.3 cm R upper paratracheal mildly hypermetabolic node, 1.4 cm precarinal hypermetabolic node, 3.4 cm left hilar hypermetabolic node were also present.  7/26/24: Surgical Pathology Report Left pleural and upper lobe biopsy showed Metastatic squamous cell carcinoma present in pleural tissue, and marked fibrinous pleuritis with severe acute and chronic inflammation, ulceration, and fibrin deposition  - Left pleural fluid analysis was POSITIVE FOR MALIGNANT CELLS. Favor squamous cell carcinoma Tier II: Variants of Potential Clinical Significance CDKN2A p.([id number]) TP53 p.([id number]) Tier III: Variants of Unknown Clinical Significance ATR p.([id number]) TMB-high PDL-1 25%  7/16/24 MRI Brain: No hemorrhage, mass, midline shift or infarction. No abnormal enhancement. Mild white matter disease likely mild chronic microvascular ischemic disease.  7/24/24: PET/CT: Hypermetablic lingular mass with central necrosis, compatible with neoplasm. Persistent moderate left pleural effusion. Areas of increased FDG uptake in the left pleura, suspicious neoplastic involvement; most prominently, 2.3 cm left lateral pleural hypermetabolic mass. Mediastinal (including in the upper right paratracheal chip station) and left hilar hypermetabolic lymphadenopathy, compatible with metastatic neoplasm. The largest of these is perivascular lymph chip mass with necrosis. Mildly thickened left adrenal gland with foci of increased FDG uptake; metastases cannot be excluded. Mildly thickened right adrenal gland, without abnormal FDG uptake. Medialization of the left vocal cord, which may be seen with left vocal cord paralysis. This may be related to the dominant mediastinal mass. Persistent peripheral left lower lobe airspace opacities without significant FDG uptake. This may be due to compressive atelectasis from the aforementioned left pleural effusion.  8/9/24: XR Chest: There is peripheral repositioning of a left-sided PICC the tip located in the midsuperior vena cava. The cardiac mediastinal and hilar contours are unchanged. Again noted is increased opacity over the left lower lung zone with silhouetting of the left heart border. There may be some air bronchograms and a parenchymal pneumonia/atelectasis unchanged from prior imaging cannot be excluded. There is left hemidiaphragm elevation. No pneumothorax.  9/3/24: CT Chest/Abdomen/Pelvis: Marked worsening of pleural-parenchymal malignant process in the left hemithorax with enlargement of the existing masses and appearance of new pleural nodules. Worsening mediastinal lymphadenopathy. Small bone lesion in the left eighth rib with nondisplaced pathologic fracture due to direct pleural or distant metastasis.  11/20/24 CT chest 1. The previously noted occluded left upper lobe bronchus is now patent. 2. Decreased size of all of the previously noted pulmonary nodule/masses as specified above. 3. Indeterminate small new sclerotic focus in the anterior aspect of the left third rib; otherwise, no new sites of disease 4. Decreased size of previously noted adrenal nodules.  3/22/25 CT chest/abdomen/pelvis FINDINGS: CHEST: LUNGS AND LARGE AIRWAYS: Patent central airways. Left paramediastinal/upper lobe enhancing mass 4.4 x 3.5 cm (previously 2.9 x 2.8 cm) with central necrosis, demonstrating encasement of left upper lobe bronchus and hilar bundle with infiltration of adjacent fat planes without gross invasion of adjacent aortic arch and pulmonary trunk/left main pulmonary artery however there is encasement of left upper lobe pulmonary artery branches similar to prior. Emphysema. Scarring in left upper lobe and lingula with traction bronchiectasis PLEURA: Solid round posterior midthoracic pleural-based nodule 1.6 (10:137) and adjacent smaller nodule 1.0 cm (10:159). Solid round anterior lateral basilar pleural-based nodule 1.3 cm within air-filled cavity 4.5 cm (previously fluid and air-filled cavity). No pleural effusion. VESSELS: No acute pulmonary thromboembolism, decreased previously seen pulmonary vein clot with minimal chronic post thrombotic changes in this region (10:255). HEART: Heart size is normal. No pericardial effusion. Patent left atrial appendage. MEDIASTINUM AND FRANCESCA: Stable right upper paratracheal lymph node 0.8 x 0.8 cm (10:102). CHEST WALL AND LOWER NECK: Unchanged enhancing nodule in left paramedian back subcutaneous tissues 1.7 cm (3:53), not radiotracer avid on the prior PET/CT stable since July 2024 however appeared nonenhancing on that study. Chronic elevated left diaphragm. Subcentimeter right thyroid nodule. Since November 2024, increased left paramediastinal/upper lobe mass and new pleural metastases. Healed pathologic left eighth rib fracture. Stable few subtle sclerotic foci in left ribs. Stable nodular adrenal thickening bilaterally. Indeterminate subcutaneous nodule in posterior left back, stable size, with new enhancement since July 2024, consider tissue sampling.

## 2025-05-20 NOTE — DISEASE MANAGEMENT
[Pathological] : TNM Stage: p [IV] : IV [TTNM] : x [NTNM] : x [MTNM] : 1 [de-identified] : 7067 [[place] [id number]] : 3401 [de-identified] : left lung

## 2025-05-20 NOTE — DISEASE MANAGEMENT
[Pathological] : TNM Stage: p [IV] : IV [TTNM] : x [NTNM] : x [MTNM] : 1 [de-identified] : 8079 [[place] [id number]] : [id number] [de-identified] : left lung

## 2025-06-02 NOTE — ASSESSMENT
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 He was initially diagnosed in July 2024 and treated with four cycles of carboplatin, nab-paclitaxel, and pembrolizumab. He is now on pembrolizumab maintenance. Prior imaging showed a good treatment response with a decrease in the size of pulmonary masses and adrenal involvement, with a new indeterminate sclerotic lesion in the left 3rd rib.   Repeat scans from 3/22 concerning for progression in primary lesion and 2 new pleural deposits. Given few sites of POD, RT with continuation of IO may be reasonable here. Case discussed at multidisciplinary tumor board and consensus indeed to treat areas of POD with radiation and continue current therapy.   Completed RT 5/13/2025.   Feels well today with improvement in cough. Mild pruritic rash of bilateral forearms noted.   Plan:   - Continue pembrolizumab, cleared for treatment today - Will plan scans in 3 mo for response assessment  - Continue topical steroid for rash- patient will call if his symptoms worsen - may be related to immunotherapy - RTC 6 weeks for next treatment    The patient is on immunotherapy requiring frequent and intensive monitoring for immune related adverse events. This includes CBC. TSH, CMP, and thorough history and directed examination

## 2025-06-02 NOTE — PHYSICAL EXAM
[Fully active, able to carry on all pre-disease performance without restriction] : Status 0 - Fully active, able to carry on all pre-disease performance without restriction [Normal] : affect appropriate [de-identified] : Normal work of breathing on room air.  Speaking full sentences normal respiratory rate on room air.  Speaking full sentences without increased work of breathing [de-identified] : scattered excoriations of bilateral forearms

## 2025-06-02 NOTE — HISTORY OF PRESENT ILLNESS
[M: ___] : M[unfilled] [AJCC Stage: ____] : AJCC Stage: [unfilled] [de-identified] : The patient is a 68-year-old male with stage IV squamous non-small cell lung cancer (NSCLC) with a PD-L1 score of 25%, following since his diagnosis in July 2024. He initially presented with syncope and was found to have an 8.4 cm hypermetabolic lingular mass with central necrosis, a left pleural effusion, FDG uptake in the left pleura, mediastinal lymphadenopathy, and a mildly thickened left adrenal gland with increased FDG uptake concerning for metastasis. There was also worsening metastatic lymphadenopathy and a small bone lesion in the left 8th rib. MRI brain on July 16, 2024, was negative for metastatic disease.    A left pleural and upper lobe biopsy on July 26, 2024, confirmed metastatic squamous cell carcinoma. Next-generation sequencing (NGS) showed no  mutations, a high tumor mutational burden (TMB), and a PD-L1 score of 25%, with several variants of potential or unknown clinical significance.    He started treatment with carboplatin, nab-paclitaxel (Abraxane), and pembrolizumab on September 12, 2024, completing four cycles. A scan on November 20, 2024, showed a decrease in the size of all previously known pulmonary masses, shrinkage of the adrenal nodule, and an indeterminate small new sclerotic focus in the anterior left 3rd rib.  3/22/25: CT CAP- Since November 2024, increased left paramediastinal/upper lobe mass and new pleural metastases. Healed pathologic left eighth rib fracture. Stable few subtle sclerotic foci in left ribs. Stable nodular adrenal thickening bilaterally. Indeterminate subcutaneous nodule in posterior left back, stable size, with new enhancement since July 2024, consider tissue sampling.  Case discussed at multidisciplinary tumor board and recommendations were to treat areas of progression with RT and continue immunotherapy  4/11/25: PET- 1. 6.6 cm hypermetabolic aortopulmonary mass, SUV max 22.0, consistent with known malignancy/metastatic disease. 4.6 x 2.6 cm cavitary lesion in the inferior lingula with a hypermetabolic nodular component, consistent with known malignancy. New FDG avid consolidation between this cavitary mass and the mediastinal (aortopulmonary) mass, probably representing malignancy. 2. Hypermetabolic smaller mediastinal nodes, consistent with metastatic disease. 3. Slightly asymmetrically increased uptake in the left adrenal gland, probably representing known adrenal metastasis. [de-identified] : Lung SCC  [de-identified] : Tier II: Variants of Potential Clinical Significance CDKN2A p.([id number]) TP53 p.([id number]) Tier III: Variants of Unknown Clinical Significance ATR p.([id number]) TMB-high PDL-1 25% [de-identified] : 5/13/25 Patient has completed 14/15 fx and 4200/4500 cGy of RT to the left lung. Doing well with improvement in his cough.   Notes development of itchy rash on bilateral forearms. Discussed with PCP and started using topical corticosteroid cream today.

## 2025-06-02 NOTE — PHYSICAL EXAM
[Fully active, able to carry on all pre-disease performance without restriction] : Status 0 - Fully active, able to carry on all pre-disease performance without restriction [Normal] : affect appropriate [de-identified] : Normal work of breathing on room air.  Speaking full sentences normal respiratory rate on room air.  Speaking full sentences without increased work of breathing [de-identified] : scattered excoriations of bilateral forearms

## 2025-06-02 NOTE — HISTORY OF PRESENT ILLNESS
[M: ___] : M[unfilled] [AJCC Stage: ____] : AJCC Stage: [unfilled] [de-identified] : The patient is a 68-year-old male with stage IV squamous non-small cell lung cancer (NSCLC) with a PD-L1 score of 25%, following since his diagnosis in July 2024. He initially presented with syncope and was found to have an 8.4 cm hypermetabolic lingular mass with central necrosis, a left pleural effusion, FDG uptake in the left pleura, mediastinal lymphadenopathy, and a mildly thickened left adrenal gland with increased FDG uptake concerning for metastasis. There was also worsening metastatic lymphadenopathy and a small bone lesion in the left 8th rib. MRI brain on July 16, 2024, was negative for metastatic disease.    A left pleural and upper lobe biopsy on July 26, 2024, confirmed metastatic squamous cell carcinoma. Next-generation sequencing (NGS) showed no  mutations, a high tumor mutational burden (TMB), and a PD-L1 score of 25%, with several variants of potential or unknown clinical significance.    He started treatment with carboplatin, nab-paclitaxel (Abraxane), and pembrolizumab on September 12, 2024, completing four cycles. A scan on November 20, 2024, showed a decrease in the size of all previously known pulmonary masses, shrinkage of the adrenal nodule, and an indeterminate small new sclerotic focus in the anterior left 3rd rib.  3/22/25: CT CAP- Since November 2024, increased left paramediastinal/upper lobe mass and new pleural metastases. Healed pathologic left eighth rib fracture. Stable few subtle sclerotic foci in left ribs. Stable nodular adrenal thickening bilaterally. Indeterminate subcutaneous nodule in posterior left back, stable size, with new enhancement since July 2024, consider tissue sampling.  Case discussed at multidisciplinary tumor board and recommendations were to treat areas of progression with RT and continue immunotherapy  4/11/25: PET- 1. 6.6 cm hypermetabolic aortopulmonary mass, SUV max 22.0, consistent with known malignancy/metastatic disease. 4.6 x 2.6 cm cavitary lesion in the inferior lingula with a hypermetabolic nodular component, consistent with known malignancy. New FDG avid consolidation between this cavitary mass and the mediastinal (aortopulmonary) mass, probably representing malignancy. 2. Hypermetabolic smaller mediastinal nodes, consistent with metastatic disease. 3. Slightly asymmetrically increased uptake in the left adrenal gland, probably representing known adrenal metastasis. [de-identified] : Lung SCC  [de-identified] : Tier II: Variants of Potential Clinical Significance CDKN2A p.([id number]) TP53 p.([id number]) Tier III: Variants of Unknown Clinical Significance ATR p.([id number]) TMB-high PDL-1 25% [de-identified] : 5/13/25 Patient has completed 14/15 fx and 4200/4500 cGy of RT to the left lung. Doing well with improvement in his cough.   Notes development of itchy rash on bilateral forearms. Discussed with PCP and started using topical corticosteroid cream today.

## 2025-07-09 NOTE — HISTORY OF PRESENT ILLNESS
[FreeTextEntry1] : Gildardo Hawkins is a 68 year old Cantonese speaking male with a hx of stage IV [id number] squamous non-small cell lung cancer (NSCLC) with a PD-L1 score of 25%, diagnosed in July 2024 and followed by  and now . On September 12, 2024 he completed 4 cycles of carboplatin, nab-paclitaxel (Abraxane), and pembrolizumab. His Novmeber 20,2024 scan showed a decrease in known pulmonary masses, and shrinkage of his adrenal nodule, and a new sclerotic focus in the anterior left third rib. Case discussed at multidisciplinary tumor board and recommendations were to treat areas of progression with RT and continue immunotherapy (pembro) per Dr. Munoz. Patient completed 15 fx and 4500 cGy of RT to the left lung 5/14/25.  Disease: NSCLC Pathology: squamous cell carcinoma - PDL-1 25%, CKDN2A TNM stage: M1 AJCC Stage: IV  7/9/25 PTE Patient returns today for follow up post radiation. 6/2 follow up with [facility], .  remains on Pembro infusions and developed a pruritic rash to bilateral forearms. Rash has since dissipated. He reports last infusion was 5 weeks ago.  He does report nausea and muscle cramps to his left arm x 2 days. He was advised to follow up with [facility] or PCP, as symptoms are not radiation related. He denies shortness of breath or hemoptysis. He does have occasional sputum produced when coughing and reports it is clear or white. Next infusion and follow up with [facility] is 7/14.  5/13/25 Patient has completed 14/15 fx and 4200/4500 cGy of RT to the left lung. PTE packet given with July follow up.  Patient denies any complaints. No shortness of breath or hemoptysis, and mucous production has subsided.   5/13/25 Patient has completed 9/15 fx and 2700/4500 cGy of RT to the left lung. His congestion has subsided with the use of Mucinex. He reports watery itchy eyes and plans to start an antihistamine soon for allergies. He denies shortness of breath or hemoptysis. To continue RT.  5/6/25 Patient has completed 4/15 fx and 1200/4500 cGy of RT to the left lung. He reported blood in sputum yesterday that subsided. Pictures reviewed and blood scant in nature. Patient denies shortness of breath or chest pain. He does report coughing of copious amount of clear sputum. Mucinex OTC recommended for symptom relief. To continue RT.  4/8/25 NEW CONSULT Cantonese  offered, patient's wife preferred to translate. Patient states he is feeling well. he denies chest pain, shortness of breath, headaches, dizziness, pain. he states he has an occasional dry cough. reports a rash to his upper chest which is resolving after using a cream prescribed by his PCP. Prior Chemo: yes Prior RT: no Pacemaker: no  ___________________________________________________________________________________________________  HPI _____  Patient has a PMH of HTN, HLD, and in July of 2024 was hospitalized for syncope and a new diagnosis of empyema with a left lingular mass with central necrosis and pleural effusion with 3 enlarged lymph nodes.  7/8/24 CT scan showed 8.4 cm lingular mass with central necrosis and pleural effusion. He was also found to have a 2.3 cm left lateral pleural mass that abuts the lingular mass. He was also found to have persistent prevascular hypermetabolic chip mass~ 5.8 cm in diameter with central necrosis. This abuts a 2.2 cm aortopulmonary hypermetabolic node which is exerting mass effect on trachea. 1.3 cm R upper paratracheal mildly hypermetabolic node, 1.4 cm precarinal hypermetabolic node, 3.4 cm left hilar hypermetabolic node were also present.  7/26/24: Surgical Pathology Report Left pleural and upper lobe biopsy showed Metastatic squamous cell carcinoma present in pleural tissue, and marked fibrinous pleuritis with severe acute and chronic inflammation, ulceration, and fibrin deposition  - Left pleural fluid analysis was POSITIVE FOR MALIGNANT CELLS. Favor squamous cell carcinoma Tier II: Variants of Potential Clinical Significance CDKN2A p.([id number]) TP53 p.([id number]) Tier III: Variants of Unknown Clinical Significance ATR p.([id number]) TMB-high PDL-1 25%  7/16/24 MRI Brain: No hemorrhage, mass, midline shift or infarction. No abnormal enhancement. Mild white matter disease likely mild chronic microvascular ischemic disease.  7/24/24: PET/CT: Hypermetablic lingular mass with central necrosis, compatible with neoplasm. Persistent moderate left pleural effusion. Areas of increased FDG uptake in the left pleura, suspicious neoplastic involvement; most prominently, 2.3 cm left lateral pleural hypermetabolic mass. Mediastinal (including in the upper right paratracheal chip station) and left hilar hypermetabolic lymphadenopathy, compatible with metastatic neoplasm. The largest of these is perivascular lymph chip mass with necrosis. Mildly thickened left adrenal gland with foci of increased FDG uptake; metastases cannot be excluded. Mildly thickened right adrenal gland, without abnormal FDG uptake. Medialization of the left vocal cord, which may be seen with left vocal cord paralysis. This may be related to the dominant mediastinal mass. Persistent peripheral left lower lobe airspace opacities without significant FDG uptake. This may be due to compressive atelectasis from the aforementioned left pleural effusion.  8/9/24: XR Chest: There is peripheral repositioning of a left-sided PICC the tip located in the midsuperior vena cava. The cardiac mediastinal and hilar contours are unchanged. Again noted is increased opacity over the left lower lung zone with silhouetting of the left heart border. There may be some air bronchograms and a parenchymal pneumonia/atelectasis unchanged from prior imaging cannot be excluded. There is left hemidiaphragm elevation. No pneumothorax.  9/3/24: CT Chest/Abdomen/Pelvis: Marked worsening of pleural-parenchymal malignant process in the left hemithorax with enlargement of the existing masses and appearance of new pleural nodules. Worsening mediastinal lymphadenopathy. Small bone lesion in the left eighth rib with nondisplaced pathologic fracture due to direct pleural or distant metastasis.  11/20/24 CT chest 1. The previously noted occluded left upper lobe bronchus is now patent. 2. Decreased size of all of the previously noted pulmonary nodule/masses as specified above. 3. Indeterminate small new sclerotic focus in the anterior aspect of the left third rib; otherwise, no new sites of disease 4. Decreased size of previously noted adrenal nodules.  3/22/25 CT chest/abdomen/pelvis FINDINGS: CHEST: LUNGS AND LARGE AIRWAYS: Patent central airways. Left paramediastinal/upper lobe enhancing mass 4.4 x 3.5 cm (previously 2.9 x 2.8 cm) with central necrosis, demonstrating encasement of left upper lobe bronchus and hilar bundle with infiltration of adjacent fat planes without gross invasion of adjacent aortic arch and pulmonary trunk/left main pulmonary artery however there is encasement of left upper lobe pulmonary artery branches similar to prior. Emphysema. Scarring in left upper lobe and lingula with traction bronchiectasis PLEURA: Solid round posterior midthoracic pleural-based nodule 1.6 (10:137) and adjacent smaller nodule 1.0 cm (10:159). Solid round anterior lateral basilar pleural-based nodule 1.3 cm within air-filled cavity 4.5 cm (previously fluid and air-filled cavity). No pleural effusion. VESSELS: No acute pulmonary thromboembolism, decreased previously seen pulmonary vein clot with minimal chronic post thrombotic changes in this region (10:255). HEART: Heart size is normal. No pericardial effusion. Patent left atrial appendage. MEDIASTINUM AND FRANCESCA: Stable right upper paratracheal lymph node 0.8 x 0.8 cm (10:102). CHEST WALL AND LOWER NECK: Unchanged enhancing nodule in left paramedian back subcutaneous tissues 1.7 cm (3:53), not radiotracer avid on the prior PET/CT stable since July 2024 however appeared nonenhancing on that study. Chronic elevated left diaphragm. Subcentimeter right thyroid nodule. Since November 2024, increased left paramediastinal/upper lobe mass and new pleural metastases. Healed pathologic left eighth rib fracture. Stable few subtle sclerotic foci in left ribs. Stable nodular adrenal thickening bilaterally. Indeterminate subcutaneous nodule in posterior left back, stable size, with new enhancement since July 2024, consider tissue sampling.

## 2025-07-09 NOTE — DISEASE MANAGEMENT
[Pathological] : TNM Stage: p [IV] : IV [TTNM] : x [NTNM] : x [MTNM] : 1 [de-identified] : [id number] [[place] [id number]] : [id number] [de-identified] : left lung

## 2025-07-09 NOTE — HISTORY OF PRESENT ILLNESS
[FreeTextEntry1] : Gildardo Hawkins is a 68 year old Cantonese speaking male with a hx of stage IV PTxNxM1 squamous non-small cell lung cancer (NSCLC) with a PD-L1 score of 25%, diagnosed in July 2024 and followed by  and now . On September 12, 2024 he completed 4 cycles of carboplatin, nab-paclitaxel (Abraxane), and pembrolizumab. His Novmeber 20,2024 scan showed a decrease in known pulmonary masses, and shrinkage of his adrenal nodule, and a new sclerotic focus in the anterior left third rib. Case discussed at multidisciplinary tumor board and recommendations were to treat areas of progression with RT and continue immunotherapy (pembro) per Dr. Munoz. Patient completed 15 fx and 4500 cGy of RT to the left lung 5/14/25.  Disease: NSCLC Pathology: squamous cell carcinoma - PDL-1 25%, CKDN2A TNM stage: M1 AJCC Stage: IV  7/9/25 PTE Patient returns today for follow up post radiation. 6/2 follow up with [facility], .  remains on Pembro infusions and developed a pruritic rash to bilateral forearms. Rash has since dissipated. He reports last infusion was 5 weeks ago.  He does report nausea and muscle cramps to his left arm x 2 days. He was advised to follow up with [facility] or PCP, as symptoms are not radiation related. He denies shortness of breath or hemoptysis. He does have occasional sputum produced when coughing and reports it is clear or white. Next infusion and follow up with [facility] is 7/14.  5/13/25 Patient has completed 14/15 fx and 4200/4500 cGy of RT to the left lung. PTE packet given with July follow up.  Patient denies any complaints. No shortness of breath or hemoptysis, and mucous production has subsided.   5/13/25 Patient has completed 9/15 fx and 2700/4500 cGy of RT to the left lung. His congestion has subsided with the use of Mucinex. He reports watery itchy eyes and plans to start an antihistamine soon for allergies. He denies shortness of breath or hemoptysis. To continue RT.  5/6/25 Patient has completed 4/15 fx and 1200/4500 cGy of RT to the left lung. He reported blood in sputum yesterday that subsided. Pictures reviewed and blood scant in nature. Patient denies shortness of breath or chest pain. He does report coughing of copious amount of clear sputum. Mucinex OTC recommended for symptom relief. To continue RT.  4/8/25 NEW CONSULT Cantonese  offered, patient's wife preferred to translate. Patient states he is feeling well. he denies chest pain, shortness of breath, headaches, dizziness, pain. he states he has an occasional dry cough. reports a rash to his upper chest which is resolving after using a cream prescribed by his PCP. Prior Chemo: yes Prior RT: no Pacemaker: no  ___________________________________________________________________________________________________  HPI _____  Patient has a PMH of HTN, HLD, and in July of 2024 was hospitalized for syncope and a new diagnosis of empyema with a left lingular mass with central necrosis and pleural effusion with 3 enlarged lymph nodes.  7/8/24 CT scan showed 8.4 cm lingular mass with central necrosis and pleural effusion. He was also found to have a 2.3 cm left lateral pleural mass that abuts the lingular mass. He was also found to have persistent prevascular hypermetabolic chip mass~ 5.8 cm in diameter with central necrosis. This abuts a 2.2 cm aortopulmonary hypermetabolic node which is exerting mass effect on trachea. 1.3 cm R upper paratracheal mildly hypermetabolic node, 1.4 cm precarinal hypermetabolic node, 3.4 cm left hilar hypermetabolic node were also present.  7/26/24: Surgical Pathology Report Left pleural and upper lobe biopsy showed Metastatic squamous cell carcinoma present in pleural tissue, and marked fibrinous pleuritis with severe acute and chronic inflammation, ulceration, and fibrin deposition  - Left pleural fluid analysis was POSITIVE FOR MALIGNANT CELLS. Favor squamous cell carcinoma Tier II: Variants of Potential Clinical Significance CDKN2A p.([id number]) TP53 p.([id number]) Tier III: Variants of Unknown Clinical Significance ATR p.([id number]) TMB-high PDL-1 25%  7/16/24 MRI Brain: No hemorrhage, mass, midline shift or infarction. No abnormal enhancement. Mild white matter disease likely mild chronic microvascular ischemic disease.  7/24/24: PET/CT: Hypermetablic lingular mass with central necrosis, compatible with neoplasm. Persistent moderate left pleural effusion. Areas of increased FDG uptake in the left pleura, suspicious neoplastic involvement; most prominently, 2.3 cm left lateral pleural hypermetabolic mass. Mediastinal (including in the upper right paratracheal chip station) and left hilar hypermetabolic lymphadenopathy, compatible with metastatic neoplasm. The largest of these is perivascular lymph chip mass with necrosis. Mildly thickened left adrenal gland with foci of increased FDG uptake; metastases cannot be excluded. Mildly thickened right adrenal gland, without abnormal FDG uptake. Medialization of the left vocal cord, which may be seen with left vocal cord paralysis. This may be related to the dominant mediastinal mass. Persistent peripheral left lower lobe airspace opacities without significant FDG uptake. This may be due to compressive atelectasis from the aforementioned left pleural effusion.  8/9/24: XR Chest: There is peripheral repositioning of a left-sided PICC the tip located in the midsuperior vena cava. The cardiac mediastinal and hilar contours are unchanged. Again noted is increased opacity over the left lower lung zone with silhouetting of the left heart border. There may be some air bronchograms and a parenchymal pneumonia/atelectasis unchanged from prior imaging cannot be excluded. There is left hemidiaphragm elevation. No pneumothorax.  9/3/24: CT Chest/Abdomen/Pelvis: Marked worsening of pleural-parenchymal malignant process in the left hemithorax with enlargement of the existing masses and appearance of new pleural nodules. Worsening mediastinal lymphadenopathy. Small bone lesion in the left eighth rib with nondisplaced pathologic fracture due to direct pleural or distant metastasis.  11/20/24 CT chest 1. The previously noted occluded left upper lobe bronchus is now patent. 2. Decreased size of all of the previously noted pulmonary nodule/masses as specified above. 3. Indeterminate small new sclerotic focus in the anterior aspect of the left third rib; otherwise, no new sites of disease 4. Decreased size of previously noted adrenal nodules.  3/22/25 CT chest/abdomen/pelvis FINDINGS: CHEST: LUNGS AND LARGE AIRWAYS: Patent central airways. Left paramediastinal/upper lobe enhancing mass 4.4 x 3.5 cm (previously 2.9 x 2.8 cm) with central necrosis, demonstrating encasement of left upper lobe bronchus and hilar bundle with infiltration of adjacent fat planes without gross invasion of adjacent aortic arch and pulmonary trunk/left main pulmonary artery however there is encasement of left upper lobe pulmonary artery branches similar to prior. Emphysema. Scarring in left upper lobe and lingula with traction bronchiectasis PLEURA: Solid round posterior midthoracic pleural-based nodule 1.6 (10:137) and adjacent smaller nodule 1.0 cm (10:159). Solid round anterior lateral basilar pleural-based nodule 1.3 cm within air-filled cavity 4.5 cm (previously fluid and air-filled cavity). No pleural effusion. VESSELS: No acute pulmonary thromboembolism, decreased previously seen pulmonary vein clot with minimal chronic post thrombotic changes in this region (10:255). HEART: Heart size is normal. No pericardial effusion. Patent left atrial appendage. MEDIASTINUM AND FRANCESCA: Stable right upper paratracheal lymph node 0.8 x 0.8 cm (10:102). CHEST WALL AND LOWER NECK: Unchanged enhancing nodule in left paramedian back subcutaneous tissues 1.7 cm (3:53), not radiotracer avid on the prior PET/CT stable since July 2024 however appeared nonenhancing on that study. Chronic elevated left diaphragm. Subcentimeter right thyroid nodule. Since November 2024, increased left paramediastinal/upper lobe mass and new pleural metastases. Healed pathologic left eighth rib fracture. Stable few subtle sclerotic foci in left ribs. Stable nodular adrenal thickening bilaterally. Indeterminate subcutaneous nodule in posterior left back, stable size, with new enhancement since July 2024, consider tissue sampling.

## 2025-07-09 NOTE — DISEASE MANAGEMENT
[Pathological] : TNM Stage: p [IV] : IV [TTNM] : x [NTNM] : x [MTNM] : 1 [de-identified] : [id number] [[place] [id number]] : [id number] [de-identified] : left lung

## 2025-07-15 NOTE — PHYSICAL EXAM
[Fully active, able to carry on all pre-disease performance without restriction] : Status 0 - Fully active, able to carry on all pre-disease performance without restriction [Normal] : affect appropriate [de-identified] : Normal work of breathing on room air.  Speaking full sentences normal respiratory rate on room air.  Speaking full sentences without increased work of breathing [de-identified] : scattered excoriations of bilateral forearms

## 2025-07-15 NOTE — PHYSICAL EXAM
[Fully active, able to carry on all pre-disease performance without restriction] : Status 0 - Fully active, able to carry on all pre-disease performance without restriction [Normal] : affect appropriate [de-identified] : Normal work of breathing on room air.  Speaking full sentences normal respiratory rate on room air.  Speaking full sentences without increased work of breathing [de-identified] : scattered excoriations of bilateral forearms

## 2025-07-15 NOTE — HISTORY OF PRESENT ILLNESS
[M: ___] : M[unfilled] [AJCC Stage: ____] : AJCC Stage: [unfilled] [de-identified] : The patient is a 68-year-old male with stage IV squamous non-small cell lung cancer (NSCLC) with a PD-L1 score of 25%, following since his diagnosis in July 2024. He initially presented with syncope and was found to have an 8.4 cm hypermetabolic lingular mass with central necrosis, a left pleural effusion, FDG uptake in the left pleura, mediastinal lymphadenopathy, and a mildly thickened left adrenal gland with increased FDG uptake concerning for metastasis. There was also worsening metastatic lymphadenopathy and a small bone lesion in the left 8th rib. MRI brain on July 16, 2024, was negative for metastatic disease.    A left pleural and upper lobe biopsy on July 26, 2024, confirmed metastatic squamous cell carcinoma. Next-generation sequencing (NGS) showed no  mutations, a high tumor mutational burden (TMB), and a PD-L1 score of 25%, with several variants of potential or unknown clinical significance.    He started treatment with carboplatin, nab-paclitaxel (Abraxane), and pembrolizumab on September 12, 2024, completing four cycles. A scan on November 20, 2024, showed a decrease in the size of all previously known pulmonary masses, shrinkage of the adrenal nodule, and an indeterminate small new sclerotic focus in the anterior left 3rd rib.  3/22/25: CT CAP- Since November 2024, increased left paramediastinal/upper lobe mass and new pleural metastases. Healed pathologic left eighth rib fracture. Stable few subtle sclerotic foci in left ribs. Stable nodular adrenal thickening bilaterally. Indeterminate subcutaneous nodule in posterior left back, stable size, with new enhancement since July 2024, consider tissue sampling.  Case discussed at multidisciplinary tumor board and recommendations were to treat areas of progression with RT and continue immunotherapy  4/11/25: PET- 1. 6.6 cm hypermetabolic aortopulmonary mass, SUV max 22.0, consistent with known malignancy/metastatic disease. 4.6 x 2.6 cm cavitary lesion in the inferior lingula with a hypermetabolic nodular component, consistent with known malignancy. New FDG avid consolidation between this cavitary mass and the mediastinal (aortopulmonary) mass, probably representing malignancy. 2. Hypermetabolic smaller mediastinal nodes, consistent with metastatic disease. 3. Slightly asymmetrically increased uptake in the left adrenal gland, probably representing known adrenal metastasis. [de-identified] : Lung SCC  [de-identified] : Tier II: Variants of Potential Clinical Significance CDKN2A p.([id number]) TP53 p.([id number]) Tier III: Variants of Unknown Clinical Significance ATR p.([id number]) TMB-high PDL-1 25% [de-identified] : 5/13/25 Patient has completed 14/15 fx and 4200/4500 cGy of RT to the left lung. Doing well with improvement in his cough.   On prior visits patient reported a rash on his arms, however he endorses that this has resolved. Reports good appetite and denies fatigue. Overall doing well.  Reports being started on Potassium tablets per his PCP.

## 2025-07-15 NOTE — PHYSICAL EXAM
[Fully active, able to carry on all pre-disease performance without restriction] : Status 0 - Fully active, able to carry on all pre-disease performance without restriction [Normal] : affect appropriate [de-identified] : Normal work of breathing on room air.  Speaking full sentences normal respiratory rate on room air.  Speaking full sentences without increased work of breathing [de-identified] : scattered excoriations of bilateral forearms

## 2025-07-15 NOTE — HISTORY OF PRESENT ILLNESS
[M: ___] : M[unfilled] [AJCC Stage: ____] : AJCC Stage: [unfilled] [de-identified] : The patient is a 68-year-old male with stage IV squamous non-small cell lung cancer (NSCLC) with a PD-L1 score of 25%, following since his diagnosis in July 2024. He initially presented with syncope and was found to have an 8.4 cm hypermetabolic lingular mass with central necrosis, a left pleural effusion, FDG uptake in the left pleura, mediastinal lymphadenopathy, and a mildly thickened left adrenal gland with increased FDG uptake concerning for metastasis. There was also worsening metastatic lymphadenopathy and a small bone lesion in the left 8th rib. MRI brain on July 16, 2024, was negative for metastatic disease.    A left pleural and upper lobe biopsy on July 26, 2024, confirmed metastatic squamous cell carcinoma. Next-generation sequencing (NGS) showed no  mutations, a high tumor mutational burden (TMB), and a PD-L1 score of 25%, with several variants of potential or unknown clinical significance.    He started treatment with carboplatin, nab-paclitaxel (Abraxane), and pembrolizumab on September 12, 2024, completing four cycles. A scan on November 20, 2024, showed a decrease in the size of all previously known pulmonary masses, shrinkage of the adrenal nodule, and an indeterminate small new sclerotic focus in the anterior left 3rd rib.  3/22/25: CT CAP- Since November 2024, increased left paramediastinal/upper lobe mass and new pleural metastases. Healed pathologic left eighth rib fracture. Stable few subtle sclerotic foci in left ribs. Stable nodular adrenal thickening bilaterally. Indeterminate subcutaneous nodule in posterior left back, stable size, with new enhancement since July 2024, consider tissue sampling.  Case discussed at multidisciplinary tumor board and recommendations were to treat areas of progression with RT and continue immunotherapy  4/11/25: PET- 1. 6.6 cm hypermetabolic aortopulmonary mass, SUV max 22.0, consistent with known malignancy/metastatic disease. 4.6 x 2.6 cm cavitary lesion in the inferior lingula with a hypermetabolic nodular component, consistent with known malignancy. New FDG avid consolidation between this cavitary mass and the mediastinal (aortopulmonary) mass, probably representing malignancy. 2. Hypermetabolic smaller mediastinal nodes, consistent with metastatic disease. 3. Slightly asymmetrically increased uptake in the left adrenal gland, probably representing known adrenal metastasis. [de-identified] : Lung SCC  [de-identified] : Tier II: Variants of Potential Clinical Significance CDKN2A p.([id number]) TP53 p.([id number]) Tier III: Variants of Unknown Clinical Significance ATR p.([id number]) TMB-high PDL-1 25% [de-identified] : 5/13/25 Patient has completed 14/15 fx and 4200/4500 cGy of RT to the left lung. Doing well with improvement in his cough.   On prior visits patient reported a rash on his arms, however he endorses that this has resolved. Reports good appetite and denies fatigue. Overall doing well.  Reports being started on Potassium tablets per his PCP.

## 2025-07-15 NOTE — HISTORY OF PRESENT ILLNESS
[M: ___] : M[unfilled] [AJCC Stage: ____] : AJCC Stage: [unfilled] [de-identified] : The patient is a 68-year-old male with stage IV squamous non-small cell lung cancer (NSCLC) with a PD-L1 score of 25%, following since his diagnosis in July 2024. He initially presented with syncope and was found to have an 8.4 cm hypermetabolic lingular mass with central necrosis, a left pleural effusion, FDG uptake in the left pleura, mediastinal lymphadenopathy, and a mildly thickened left adrenal gland with increased FDG uptake concerning for metastasis. There was also worsening metastatic lymphadenopathy and a small bone lesion in the left 8th rib. MRI brain on July 16, 2024, was negative for metastatic disease.    A left pleural and upper lobe biopsy on July 26, 2024, confirmed metastatic squamous cell carcinoma. Next-generation sequencing (NGS) showed no  mutations, a high tumor mutational burden (TMB), and a PD-L1 score of 25%, with several variants of potential or unknown clinical significance.    He started treatment with carboplatin, nab-paclitaxel (Abraxane), and pembrolizumab on September 12, 2024, completing four cycles. A scan on November 20, 2024, showed a decrease in the size of all previously known pulmonary masses, shrinkage of the adrenal nodule, and an indeterminate small new sclerotic focus in the anterior left 3rd rib.  3/22/25: CT CAP- Since November 2024, increased left paramediastinal/upper lobe mass and new pleural metastases. Healed pathologic left eighth rib fracture. Stable few subtle sclerotic foci in left ribs. Stable nodular adrenal thickening bilaterally. Indeterminate subcutaneous nodule in posterior left back, stable size, with new enhancement since July 2024, consider tissue sampling.  Case discussed at multidisciplinary tumor board and recommendations were to treat areas of progression with RT and continue immunotherapy  4/11/25: PET- 1. 6.6 cm hypermetabolic aortopulmonary mass, SUV max 22.0, consistent with known malignancy/metastatic disease. 4.6 x 2.6 cm cavitary lesion in the inferior lingula with a hypermetabolic nodular component, consistent with known malignancy. New FDG avid consolidation between this cavitary mass and the mediastinal (aortopulmonary) mass, probably representing malignancy. 2. Hypermetabolic smaller mediastinal nodes, consistent with metastatic disease. 3. Slightly asymmetrically increased uptake in the left adrenal gland, probably representing known adrenal metastasis. [de-identified] : Lung SCC  [de-identified] : Tier II: Variants of Potential Clinical Significance CDKN2A p.([id number]) TP53 p.([id number]) Tier III: Variants of Unknown Clinical Significance ATR p.([id number]) TMB-high PDL-1 25% [de-identified] : 5/13/25 Patient has completed 14/15 fx and 4200/4500 cGy of RT to the left lung. Doing well with improvement in his cough.   On prior visits patient reported a rash on his arms, however he endorses that this has resolved. Reports good appetite and denies fatigue. Overall doing well.  Reports being started on Potassium tablets per his PCP.

## 2025-07-15 NOTE — ASSESSMENT
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 He was initially diagnosed in July 2024 and treated with four cycles of carboplatin, nab-paclitaxel, and pembrolizumab. He is now on pembrolizumab maintenance. Prior imaging showed a good treatment response with a decrease in the size of pulmonary masses and adrenal involvement, with a new indeterminate sclerotic lesion in the left 3rd rib.   Repeat scans from 3/22 concerning for progression in primary lesion and 2 new pleural deposits. Given few sites of POD, RT with continuation of IO may be reasonable here. Case discussed at multidisciplinary tumor board and consensus indeed to treat areas of POD with radiation and continue current therapy.   Completed RT 5/13/2025.   Feels well today with improvement in cough.  Plan:   - Continue pembrolizumab, cleared for treatment today - Will plan repeat scans in 3 months after radiation which were completed in May 2025. Scans ordered for August.  - RTC 6 weeks for next treatment.    The patient is on immunotherapy requiring frequent and intensive monitoring for immune related adverse events. This includes CBC. TSH, CMP, and thorough history and directed examination